# Patient Record
Sex: MALE | Race: WHITE | Employment: FULL TIME | URBAN - METROPOLITAN AREA
[De-identification: names, ages, dates, MRNs, and addresses within clinical notes are randomized per-mention and may not be internally consistent; named-entity substitution may affect disease eponyms.]

---

## 2018-12-12 ENCOUNTER — HOSPITAL ENCOUNTER (OUTPATIENT)
Dept: GENERAL RADIOLOGY | Age: 58
Discharge: HOME | End: 2018-12-12
Attending: FAMILY MEDICINE
Payer: COMMERCIAL

## 2018-12-12 DIAGNOSIS — Z01.818 PREOP EXAMINATION: ICD-10-CM

## 2018-12-12 PROCEDURE — 71046 X-RAY EXAM CHEST 2 VIEWS: CPT

## 2018-12-18 RX ORDER — PRAVASTATIN SODIUM 40 MG/1
40 TABLET ORAL DAILY
COMMUNITY

## 2018-12-18 RX ORDER — BISMUTH SUBSALICYLATE 262 MG
1 TABLET,CHEWABLE ORAL DAILY
COMMUNITY

## 2018-12-18 RX ORDER — BENAZEPRIL HYDROCHLORIDE AND HYDROCHLOROTHIAZIDE 20; 12.5 MG/1; MG/1
1 TABLET ORAL DAILY
COMMUNITY

## 2018-12-18 RX ORDER — TAMSULOSIN HYDROCHLORIDE 0.4 MG/1
0.4 CAPSULE ORAL DAILY
COMMUNITY

## 2018-12-18 NOTE — PERIOP NOTES
[address] , [address] [facility]                            MAIN OR                                  [phone]   MAIN PRE OP                          [phone]                                                                                AMBULATORY PRE OP          [phone]  PRE-ADMISSION TESTING    [phone]     Surgery Date:   12/20/18       Is surgery arrival time given by surgeon? NO  If NO, [facility] staff will call you between 3 and 7pm the day before your surgery with your arrival time. (If your surgery is on a Monday, we will call you the Friday before.)    Call [phone] after 7pm Monday-Friday if you did not receive your arrival time. INSTRUCTIONS BEFORE YOUR SURGERY   When You  Arrive   Arrive at the [address] [facility] on the day of your surgery  Have your insurance card, photo ID, and any copayment (if needed)     Food   and   Drink   NO food or drink after midnight the night before surgery    This means NO water, gum, mints, coffee, juice, etc.  No alcohol (beer, wine, liquor) 24 hours before and after surgery     Medications to   TAKE   Morning of Surgery   MEDICATIONS TO TAKE THE MORNING OF SURGERY WITH A SIP OF WATER:   flomax      Medications  To  STOP      7 days before surgery    Non-Steroidal anti-inflammatory Drugs (NSAID's): for example, Ibuprofen (Advil, Motrin), Naproxen (Aleve)   Aspirin, if taking for pain    Herbal supplements, vitamins, and fish oil   Other:  (Pain medications not listed above, including Tylenol may be taken)   Blood  Thinners    If you take  Aspirin, Plavix, Coumadin, or any blood-thinning or anti-blood clot medicine, talk to the doctor who prescribed the medications for pre-operative instructions.      Bathing Clothing  Jewelry  Valuables       If you shower the morning of surgery, please do not apply anything to your skin (lotions, powders, deodorant, or makeup, especially angeles)   Follow all special bath instructions (for total joint replacement, spine and bowel surgeries)   Do not shave or trim anywhere 24 hours before surgery   Wear your hair loose or down; no pony-tails, buns, or metal hair clips   Wear loose, comfortable, clean clothes   Wear glasses instead of contacts   Leave money, valuables, and jewelry, including body piercings, at home     Going Home       or Spending the Night    SAME-DAY SURGERY: You must have a responsible adult drive you home and stay with you 24 hours after surgery   ADMITS: If your doctor is keeping you into the hospital after surgery, leave personal belongings/luggage in your car until you have a hospital room number. Hospital discharge time is 12 noon  Drivers must be here before 12 noon unless you are told differently   Special Instructions Bring glasses case        Follow all instructions so your surgery wont be cancelled. Please, be on time. If a situation occurs and you are delayed the day of surgery, call [phone] or          [phone]. If your physical condition changes (like a fever, cold, flu, etc.) call your surgeon. The patient was contacted  via phone. The patient verbalizes understanding of all instructions and does not  need reinforcement.

## 2018-12-19 ENCOUNTER — ANESTHESIA EVENT (OUTPATIENT)
Dept: SURGERY | Age: 58
End: 2018-12-19
Payer: COMMERCIAL

## 2018-12-20 ENCOUNTER — APPOINTMENT (OUTPATIENT)
Dept: GENERAL RADIOLOGY | Age: 58
End: 2018-12-20
Attending: PODIATRIST
Payer: COMMERCIAL

## 2018-12-20 ENCOUNTER — ANESTHESIA (OUTPATIENT)
Dept: SURGERY | Age: 58
End: 2018-12-20
Payer: COMMERCIAL

## 2018-12-20 ENCOUNTER — HOSPITAL ENCOUNTER (OUTPATIENT)
Age: 58
Setting detail: OUTPATIENT SURGERY
Discharge: HOME | End: 2018-12-20
Attending: PODIATRIST | Admitting: PODIATRIST
Payer: COMMERCIAL

## 2018-12-20 VITALS
TEMPERATURE: 98 F | HEIGHT: 69 IN | WEIGHT: 199.52 LBS | DIASTOLIC BLOOD PRESSURE: 83 MMHG | OXYGEN SATURATION: 99 % | RESPIRATION RATE: 15 BRPM | SYSTOLIC BLOOD PRESSURE: 159 MMHG | BODY MASS INDEX: 29.55 KG/M2 | HEART RATE: 85 BPM

## 2018-12-20 PROBLEM — M62.40: Status: ACTIVE | Noted: 2018-12-20

## 2018-12-20 PROBLEM — M20.41 HAMMERTOE OF RIGHT FOOT: Status: ACTIVE | Noted: 2018-12-20

## 2018-12-20 PROBLEM — M20.11 HALLUX VALGUS (ACQUIRED), RIGHT FOOT: Status: ACTIVE | Noted: 2018-12-20

## 2018-12-20 PROCEDURE — 74011250636 HC RX REV CODE- 250/636

## 2018-12-20 PROCEDURE — 77030032490 HC SLV COMPR SCD KNE COVD -B: Performed by: PODIATRIST

## 2018-12-20 PROCEDURE — 76210000020 HC REC RM PH II FIRST 0.5 HR: Performed by: PODIATRIST

## 2018-12-20 PROCEDURE — 77030011640 HC PAD GRND REM COVD -A: Performed by: PODIATRIST

## 2018-12-20 PROCEDURE — 74011000250 HC RX REV CODE- 250: Performed by: PODIATRIST

## 2018-12-20 PROCEDURE — 77030020782 HC GWN BAIR PAWS FLX 3M -B

## 2018-12-20 PROCEDURE — 77030021807 HC PIN TEMP FIX WRGH -B: Performed by: PODIATRIST

## 2018-12-20 PROCEDURE — 77030002933 HC SUT MCRYL J&J -A: Performed by: PODIATRIST

## 2018-12-20 PROCEDURE — 74011250636 HC RX REV CODE- 250/636: Performed by: ANESTHESIOLOGY

## 2018-12-20 PROCEDURE — C1713 ANCHOR/SCREW BN/BN,TIS/BN: HCPCS | Performed by: PODIATRIST

## 2018-12-20 PROCEDURE — 76210000016 HC OR PH I REC 1 TO 1.5 HR: Performed by: PODIATRIST

## 2018-12-20 PROCEDURE — 77030003790: Performed by: PODIATRIST

## 2018-12-20 PROCEDURE — 77030031139 HC SUT VCRL2 J&J -A: Performed by: PODIATRIST

## 2018-12-20 PROCEDURE — 74011250636 HC RX REV CODE- 250/636: Performed by: PODIATRIST

## 2018-12-20 PROCEDURE — 77030000032 HC CUF TRNQT ZIMM -B: Performed by: PODIATRIST

## 2018-12-20 PROCEDURE — 77030006773 HC BLD SAW OSC BRSM -A: Performed by: PODIATRIST

## 2018-12-20 PROCEDURE — 77030029732 HC BIT DRL ORTHOLOC 3DI WRGH -B: Performed by: PODIATRIST

## 2018-12-20 PROCEDURE — 77030008684 HC TU ET CUF COVD -B: Performed by: ANESTHESIOLOGY

## 2018-12-20 PROCEDURE — 77030018836 HC SOL IRR NACL ICUM -A: Performed by: PODIATRIST

## 2018-12-20 PROCEDURE — 74011000250 HC RX REV CODE- 250

## 2018-12-20 PROCEDURE — C1781 MESH (IMPLANTABLE): HCPCS | Performed by: PODIATRIST

## 2018-12-20 PROCEDURE — 77030003044 HC WRE K WRGH -B: Performed by: PODIATRIST

## 2018-12-20 PROCEDURE — 76030000006 HC AMB SURG OR TIME 3 TO 3.5: Performed by: PODIATRIST

## 2018-12-20 PROCEDURE — 76001 XR FLUOROSCOPY OVER 60 MINUTES: CPT

## 2018-12-20 PROCEDURE — 76060000066 HC AMB SURG ANES 3 TO 3.5 HR: Performed by: PODIATRIST

## 2018-12-20 PROCEDURE — 77030039553: Performed by: PODIATRIST

## 2018-12-20 DEVICE — IMPLANTABLE DEVICE
Type: IMPLANTABLE DEVICE | Site: TOE | Status: FUNCTIONAL
Brand: ORTHOLOC

## 2018-12-20 DEVICE — IMPLANTABLE DEVICE
Type: IMPLANTABLE DEVICE | Site: TOE | Status: FUNCTIONAL
Brand: ORTHOLOC 3DI

## 2018-12-20 RX ORDER — ONDANSETRON 2 MG/ML
4 INJECTION INTRAMUSCULAR; INTRAVENOUS AS NEEDED
Status: DISCONTINUED | OUTPATIENT
Start: 2018-12-20 | End: 2018-12-21 | Stop reason: HOSPADM

## 2018-12-20 RX ORDER — CEFAZOLIN SODIUM/WATER 2 G/20 ML
2 SYRINGE (ML) INTRAVENOUS ONCE
Status: COMPLETED | OUTPATIENT
Start: 2018-12-20 | End: 2018-12-20

## 2018-12-20 RX ORDER — ROCURONIUM BROMIDE 10 MG/ML
INJECTION, SOLUTION INTRAVENOUS AS NEEDED
Status: DISCONTINUED | OUTPATIENT
Start: 2018-12-20 | End: 2018-12-20 | Stop reason: HOSPADM

## 2018-12-20 RX ORDER — HYDROMORPHONE HYDROCHLORIDE 2 MG/ML
INJECTION, SOLUTION INTRAMUSCULAR; INTRAVENOUS; SUBCUTANEOUS AS NEEDED
Status: DISCONTINUED | OUTPATIENT
Start: 2018-12-20 | End: 2018-12-20 | Stop reason: HOSPADM

## 2018-12-20 RX ORDER — DEXAMETHASONE SODIUM PHOSPHATE 4 MG/ML
INJECTION, SOLUTION INTRA-ARTICULAR; INTRALESIONAL; INTRAMUSCULAR; INTRAVENOUS; SOFT TISSUE AS NEEDED
Status: DISCONTINUED | OUTPATIENT
Start: 2018-12-20 | End: 2018-12-20 | Stop reason: HOSPADM

## 2018-12-20 RX ORDER — SODIUM CHLORIDE, SODIUM LACTATE, POTASSIUM CHLORIDE, CALCIUM CHLORIDE 600; 310; 30; 20 MG/100ML; MG/100ML; MG/100ML; MG/100ML
INJECTION, SOLUTION INTRAVENOUS
Status: DISCONTINUED | OUTPATIENT
Start: 2018-12-20 | End: 2018-12-20 | Stop reason: HOSPADM

## 2018-12-20 RX ORDER — SUCCINYLCHOLINE CHLORIDE 20 MG/ML
INJECTION INTRAMUSCULAR; INTRAVENOUS AS NEEDED
Status: DISCONTINUED | OUTPATIENT
Start: 2018-12-20 | End: 2018-12-20 | Stop reason: HOSPADM

## 2018-12-20 RX ORDER — LIDOCAINE HYDROCHLORIDE 10 MG/ML
INJECTION INFILTRATION; PERINEURAL AS NEEDED
Status: DISCONTINUED | OUTPATIENT
Start: 2018-12-20 | End: 2018-12-20 | Stop reason: HOSPADM

## 2018-12-20 RX ORDER — PROPOFOL 10 MG/ML
INJECTION, EMULSION INTRAVENOUS AS NEEDED
Status: DISCONTINUED | OUTPATIENT
Start: 2018-12-20 | End: 2018-12-20 | Stop reason: HOSPADM

## 2018-12-20 RX ORDER — ALBUTEROL SULFATE 0.83 MG/ML
2.5 SOLUTION RESPIRATORY (INHALATION) AS NEEDED
Status: DISCONTINUED | OUTPATIENT
Start: 2018-12-20 | End: 2018-12-21 | Stop reason: HOSPADM

## 2018-12-20 RX ORDER — HYDROMORPHONE HYDROCHLORIDE 1 MG/ML
0.5 INJECTION, SOLUTION INTRAMUSCULAR; INTRAVENOUS; SUBCUTANEOUS
Status: ACTIVE | OUTPATIENT
Start: 2018-12-20 | End: 2018-12-20

## 2018-12-20 RX ORDER — MIDAZOLAM HYDROCHLORIDE 1 MG/ML
INJECTION, SOLUTION INTRAMUSCULAR; INTRAVENOUS AS NEEDED
Status: DISCONTINUED | OUTPATIENT
Start: 2018-12-20 | End: 2018-12-20 | Stop reason: HOSPADM

## 2018-12-20 RX ORDER — ONDANSETRON 2 MG/ML
INJECTION INTRAMUSCULAR; INTRAVENOUS AS NEEDED
Status: DISCONTINUED | OUTPATIENT
Start: 2018-12-20 | End: 2018-12-20 | Stop reason: HOSPADM

## 2018-12-20 RX ORDER — BUPIVACAINE HYDROCHLORIDE AND EPINEPHRINE 5; 5 MG/ML; UG/ML
INJECTION, SOLUTION EPIDURAL; INTRACAUDAL; PERINEURAL AS NEEDED
Status: DISCONTINUED | OUTPATIENT
Start: 2018-12-20 | End: 2018-12-20 | Stop reason: HOSPADM

## 2018-12-20 RX ORDER — BUPIVACAINE HYDROCHLORIDE 5 MG/ML
INJECTION, SOLUTION EPIDURAL; INTRACAUDAL AS NEEDED
Status: DISCONTINUED | OUTPATIENT
Start: 2018-12-20 | End: 2018-12-20 | Stop reason: HOSPADM

## 2018-12-20 RX ORDER — FENTANYL CITRATE 50 UG/ML
INJECTION, SOLUTION INTRAMUSCULAR; INTRAVENOUS AS NEEDED
Status: DISCONTINUED | OUTPATIENT
Start: 2018-12-20 | End: 2018-12-20 | Stop reason: HOSPADM

## 2018-12-20 RX ORDER — EPHEDRINE SULFATE 50 MG/ML
INJECTION, SOLUTION INTRAVENOUS AS NEEDED
Status: DISCONTINUED | OUTPATIENT
Start: 2018-12-20 | End: 2018-12-20 | Stop reason: HOSPADM

## 2018-12-20 RX ORDER — NALOXONE HYDROCHLORIDE 0.4 MG/ML
INJECTION, SOLUTION INTRAMUSCULAR; INTRAVENOUS; SUBCUTANEOUS AS NEEDED
Status: DISCONTINUED | OUTPATIENT
Start: 2018-12-20 | End: 2018-12-20 | Stop reason: HOSPADM

## 2018-12-20 RX ORDER — SODIUM CHLORIDE, SODIUM LACTATE, POTASSIUM CHLORIDE, CALCIUM CHLORIDE 600; 310; 30; 20 MG/100ML; MG/100ML; MG/100ML; MG/100ML
150 INJECTION, SOLUTION INTRAVENOUS CONTINUOUS
Status: DISCONTINUED | OUTPATIENT
Start: 2018-12-20 | End: 2018-12-20 | Stop reason: HOSPADM

## 2018-12-20 RX ORDER — SODIUM CHLORIDE, SODIUM LACTATE, POTASSIUM CHLORIDE, CALCIUM CHLORIDE 600; 310; 30; 20 MG/100ML; MG/100ML; MG/100ML; MG/100ML
125 INJECTION, SOLUTION INTRAVENOUS CONTINUOUS
Status: DISCONTINUED | OUTPATIENT
Start: 2018-12-20 | End: 2018-12-21 | Stop reason: HOSPADM

## 2018-12-20 RX ORDER — DIPHENHYDRAMINE HYDROCHLORIDE 50 MG/ML
12.5 INJECTION, SOLUTION INTRAMUSCULAR; INTRAVENOUS AS NEEDED
Status: DISCONTINUED | OUTPATIENT
Start: 2018-12-20 | End: 2018-12-20 | Stop reason: HOSPADM

## 2018-12-20 RX ORDER — LIDOCAINE HYDROCHLORIDE 10 MG/ML
0.1 INJECTION, SOLUTION EPIDURAL; INFILTRATION; INTRACAUDAL; PERINEURAL AS NEEDED
Status: DISCONTINUED | OUTPATIENT
Start: 2018-12-20 | End: 2018-12-20 | Stop reason: HOSPADM

## 2018-12-20 RX ADMIN — ROCURONIUM BROMIDE 20 MG: 10 INJECTION, SOLUTION INTRAVENOUS at 17:20

## 2018-12-20 RX ADMIN — PROPOFOL 200 MG: 10 INJECTION, EMULSION INTRAVENOUS at 14:54

## 2018-12-20 RX ADMIN — FENTANYL CITRATE 100 MCG: 50 INJECTION, SOLUTION INTRAMUSCULAR; INTRAVENOUS at 14:50

## 2018-12-20 RX ADMIN — FENTANYL CITRATE 50 MCG: 50 INJECTION, SOLUTION INTRAMUSCULAR; INTRAVENOUS at 15:46

## 2018-12-20 RX ADMIN — EPHEDRINE SULFATE 10 MG: 50 INJECTION, SOLUTION INTRAVENOUS at 17:37

## 2018-12-20 RX ADMIN — EPHEDRINE SULFATE 10 MG: 50 INJECTION, SOLUTION INTRAVENOUS at 16:43

## 2018-12-20 RX ADMIN — SUCCINYLCHOLINE CHLORIDE 140 MG: 20 INJECTION INTRAMUSCULAR; INTRAVENOUS at 14:54

## 2018-12-20 RX ADMIN — Medication 2 G: at 14:57

## 2018-12-20 RX ADMIN — SODIUM CHLORIDE, SODIUM LACTATE, POTASSIUM CHLORIDE, CALCIUM CHLORIDE: 600; 310; 30; 20 INJECTION, SOLUTION INTRAVENOUS at 16:44

## 2018-12-20 RX ADMIN — ROCURONIUM BROMIDE 20 MG: 10 INJECTION, SOLUTION INTRAVENOUS at 15:12

## 2018-12-20 RX ADMIN — SODIUM CHLORIDE, SODIUM LACTATE, POTASSIUM CHLORIDE, AND CALCIUM CHLORIDE 150 ML/HR: 600; 310; 30; 20 INJECTION, SOLUTION INTRAVENOUS at 10:15

## 2018-12-20 RX ADMIN — ONDANSETRON 4 MG: 2 INJECTION INTRAMUSCULAR; INTRAVENOUS at 17:36

## 2018-12-20 RX ADMIN — LIDOCAINE HYDROCHLORIDE 0.1 ML: 10 INJECTION, SOLUTION EPIDURAL; INFILTRATION; INTRACAUDAL; PERINEURAL at 10:15

## 2018-12-20 RX ADMIN — ROCURONIUM BROMIDE 20 MG: 10 INJECTION, SOLUTION INTRAVENOUS at 15:45

## 2018-12-20 RX ADMIN — SODIUM CHLORIDE, SODIUM LACTATE, POTASSIUM CHLORIDE, CALCIUM CHLORIDE: 600; 310; 30; 20 INJECTION, SOLUTION INTRAVENOUS at 14:50

## 2018-12-20 RX ADMIN — NALOXONE HYDROCHLORIDE 0.1 MG: 0.4 INJECTION, SOLUTION INTRAMUSCULAR; INTRAVENOUS; SUBCUTANEOUS at 18:01

## 2018-12-20 RX ADMIN — HYDROMORPHONE HYDROCHLORIDE 2 MG: 2 INJECTION, SOLUTION INTRAMUSCULAR; INTRAVENOUS; SUBCUTANEOUS at 17:20

## 2018-12-20 RX ADMIN — MIDAZOLAM HYDROCHLORIDE 2 MG: 1 INJECTION, SOLUTION INTRAMUSCULAR; INTRAVENOUS at 14:50

## 2018-12-20 RX ADMIN — DEXAMETHASONE SODIUM PHOSPHATE 8 MG: 4 INJECTION, SOLUTION INTRA-ARTICULAR; INTRALESIONAL; INTRAMUSCULAR; INTRAVENOUS; SOFT TISSUE at 17:36

## 2018-12-20 RX ADMIN — FENTANYL CITRATE 50 MCG: 50 INJECTION, SOLUTION INTRAMUSCULAR; INTRAVENOUS at 16:18

## 2018-12-20 NOTE — ANESTHESIA PREPROCEDURE EVALUATION
Anesthetic History   No history of anesthetic complications            Review of Systems / Medical History  Patient summary reviewed and pertinent labs reviewed    Pulmonary  Within defined limits                 Neuro/Psych         Psychiatric history     Cardiovascular    Hypertension              Exercise tolerance: >4 METS     GI/Hepatic/Renal  Within defined limits              Endo/Other  Within defined limits           Other Findings              Physical Exam    Airway  Mallampati: II  TM Distance: 4 - 6 cm  Neck ROM: normal range of motion   Mouth opening: Normal     Cardiovascular    Rhythm: regular  Rate: normal         Dental    Dentition: Lower partial plate and Upper partial plate     Pulmonary  Breath sounds clear to auscultation               Abdominal  GI exam deferred       Other Findings            Anesthetic Plan    ASA: 2  Anesthesia type: general          Induction: Intravenous  Anesthetic plan and risks discussed with: Patient

## 2018-12-20 NOTE — DISCHARGE INSTRUCTIONS
Post-Operative Instructions:    Patient Name: Jose A Reis. Patient MRN: [id number]  : 1960  Discharged Date: 2018      MEDICATIONS:   -If you experience nausea, take anti-nausea medication   -Take pain medication regularly for first 48 hours. Then take as needed for pain.     -Often anti-nausea medication helps relieve pain due to it's mild sedative effect.   -Constipation can be a common side effect when taking narcotic pain medications, take precautions to avoid this:    -Drink plenty of fluids    -Eat plenty of fiber    -Avoid caffeine and dairy products    -Take stool softener if needed (Colace/Dulcolax)    DIET:   -Eat light foods for first meal today (ie: dry cereal/toast/crackers, clear fluids). -If tolerated first meal, then can eat normally at second meal.    ACTIVITY:   -Relative BED REST for first 72 hours (only getting to bathroom, bedroom, kitchen)   -Keep surgical shoe/boot on at all times (even when sleeping)   -Elevate surgical site at all times (at least 6 inches above hip level)   -Apply ice pack to just above surgical site (NOT directly on the site), 10 mins on and 20 mins off for the first 72 hours.   -Weight-bearing: NWB right foot with crutches. DRESSINGS/SHOWER:   -Keep dressing clean, dry, and intact at all times.   -Reinforce dressing as needed, but DO NOT remove dressing!! EMERGENCY:   -Call office ([phone]) or on all pager after hours ([phone]) if. ..    -bandages become wet or heavy drainage/bleeding noted    -medications are not helping your pain    -you injure or bump the surgical site    -you have fever over 101.5 degrees    FOLLOW-UP:   -Make sure you follow-up with your doctor within 1 week of surgery.    -Call office ([phone]) if you need to schedule appointment     ACTIVITY:  · Elevate feet  · Use crutches as directed by your doctor    DIET:  · Clear liquids until no nausea or vomiting; then light diet for the first day  · An advance to regular diet On second day, unless your doctor orders otherwise. PAIN:  · Take pain medication as directed by your doctor. · Call your doctor if pain is not relieved by medication. · Do not take aspirin or blood thinners until directed by your doctor. Patients signature:  Date: 12/20/2018  Discharging Nurse:    Physician: Cayla Guillermo., DPM            DISCHARGE SUMMARY from your Nurse    The following personal items collected during your admission are returned to you:   Dental Appliance: Dental Appliances: None  Vision: Visual Aid: Glasses  Hearing Aid:    Jewelry: Jewelry: None  Clothing: Clothing: Footwear, Pants, Undergarments, Shirt  Other Valuables: Other Valuables: None  Valuables sent to safe: Personal Items Sent to Safe: n/a    PATIENT INSTRUCTIONS:    After general anesthesia or intravenous sedation, for 24 hours or while taking prescription Narcotics:  · Limit your activities  · Do not drive and operate hazardous machinery  · Do not make important personal or business decisions  · Do  not drink alcoholic beverages  · If you have not urinated within 8 hours after discharge, please contact your surgeon on call. Report the following to your surgeon:  · Excessive pain, swelling, redness or odor of or around the surgical area  · Temperature over 100.5  · Nausea and vomiting lasting longer than 4 hours or if unable to take medications  · Any signs of decreased circulation or nerve impairment to extremity: change in color, persistent  numbness, tingling, coldness or increase pain  · Any questions    COUGH AND DEEP BREATHE    Breathing deep and coughing are very important exercises to do after surgery. Deep breathing and coughing open the little air tubes and air sacks in your lungs. You take deep breaths every day. You may not even notice - it is just something you do when you sigh or yawn. It is a natural exercise you do to keep these air passages open.      After surgery, take deep breaths and cough, on purpose. Coughing and deep breathing help prevent bronchitis and pneumonia after surgery. If you had chest or belly surgery, use a pillow as a \"hug crescencio\" and hold it tightly to your chest or belly when you cough. DIRECTIONS:  6. Take 10 to 15 slow deep breaths every hour while awake. 7. Breathe in deeply, and hold it for 2 seconds. 8. Exhale slowly through puckered lips, like blowing up a balloon. 9. After every 4th or 5th deep breath, hug your pillow to your chest or belly and give a hard, deep cough. Yes, it will probably hurt. But doing this exercise is very important part of healing after surgery. Take your pain medicine to help you do this exercise without too much pain. IF YOU HAVE BEEN DIAGNOSED WITH SLEEP APNEA, PLEASE USE YOUR SLEEP APNEA DEVICE OR CPAP MACHINE WHEN YOU INTEND TO NAP AFTER TAKING PAIN MEDICATION. Ankle Pumps    Ankle pumps increase the circulation of oxygenated blood to your lower extremities and decrease your risk for circulation problems such as blood clots. They also stretch the muscles, tendons and ligaments in your foot and ankle, and prevent joint contracture in the ankle and foot, especially after surgeries on the legs. It is important to do ankle pump exercises regularly after surgery because immobility increases your risk for developing a blood clot. Your doctor may also have you take an Aspirin for the next few days as well. If your doctor did not ask you to take an Aspirin, consult with him before starting Aspirin therapy on your own. Slowly point your foot forward, feeling the muscles on the top of your lower leg stretch, and hold this position for 5 seconds. Next, pull your foot back toward you as far as possible, stretching the calf muscles, and hold that position for 5 seconds. Repeat with the other foot.   Perform 10 repetitions every hour while awake for both ankles if possible (down and then up with the foot once is one repetition). You should feel gentle stretching of the muscles in your lower leg when doing this exercise. If you feel pain, or your range of motion is limited, don't  Push too hard. Only go the limit your joint and muscles will let you go. If you have increasing pain, progressively worsening leg warmth or swelling, STOP the exercise and call your doctor. Below is information about the medications your doctor is prescribing after your visit:    Other information in your discharge envelope:  []     PRESCRIPTIONS  []     PHYSICAL THERAPY PRESCRIPTION  []     APPOINTMENT CARDS  []     Regional Anesthesia Pamphlet for block or block with On-Q Catheter from Anesthesia Service  []     Medical device information sheets/pamphlets from their    []     School/work excuse note. []     /parent work excuse note. These are general instructions for a healthy lifestyle:    *  Please give a list of your current medications to your Primary Care Provider. *  Please update this list whenever your medications are discontinued, doses are      changed, or new medications (including over-the-counter products) are added. *  Please carry medication information at all times in case of emergency situations. About Smoking  No smoking / No tobacco products / Avoid exposure to second hand smoke    Surgeon General's Warning:  Quitting smoking now greatly reduces serious risk to your health. Obesity, smoking, and sedentary lifestyle greatly increases your risk for illness and disease. A healthy diet, regular physical exercise & weight monitoring are important for maintaining a healthy lifestyle.     Congestive Heart Failure  You may be retaining fluid if you have a history of heart failure or if you experience any of the following symptoms:  Weight gain of 3 pounds or more overnight or 5 pounds in a week, increased swelling in our hands or feet or shortness of breath while lying flat in bed. Please call your doctor as soon as you notice any of these symptoms; do not wait until your next office visit. Recognize signs and symptoms of STROKE:  F - face looks uneven  A - arms unable to move or move even  S - speech slurred or non-existent  T - time-call 911 as soon as signs and symptoms begin-DO NOT go         Back to bed or wait to see if you get better-TIME IS BRAIN. Warning signs of HEART ATTACK  Call 911 if you have these symptoms    · Chest discomfort. Most heart attacks involve discomfort in the center of the chest that lasts more than a few minutes, or that goes away and comes back. It can feel like uncomfortable pressure, squeezing, fullness, or pain. · Discomfort in other areas of the upper body. Symptoms can include pain or discomfort in one or both        Arms, the back, neck, jaw, or stomach. ·  Shortness of breath with or without chest discomfort. · Other signs may include breaking out in a cold sweat, nausea, or lightheadedness    Don't wait more than five minutes to call 911 - MINUTES MATTER! Fast action can save your life. Calling 911 is almost always the fastest way to get lifesaving treatment. Emergency Medical Services staff can begin treatment when they arrive - up to an hour sooner than if someone gets to the hospital by car.

## 2018-12-20 NOTE — BRIEF OP NOTE
BRIEF OPERATIVE NOTE    Date of Procedure: 12/20/2018   Preoperative Diagnosis: HALLUX VALGUS DEFORMITY. 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 CONTRACTED EXTENSOR TENDON 2ND MPJ ALL Right foot  Postoperative Diagnosis: * HALLUX VALGUS DEFORMITY. 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 CONTRACTED EXTENSOR TENDON 2ND MPJ ALL Right foot   Procedure(s): 1st MPJ ARTHRODESIS. 2ND PIPJ ARTHRODESIS. TENOTOMY AND CAPSULOTOMY 2ND MPJ  Surgeon(s) and Role:     * Zhou Coelho DPM - Primary         Surgical Assistant: none. Surgical Staff:  * No surgical staff found *  No case tracking events are documented in the log. Anesthesia: General   Estimated Blood Loss: 5 mL. Specimens: * No specimens in log *   Findings: Hypertrophic bone, contracted tendons. Complications: None.    Implants: * No implants in log *

## 2018-12-21 NOTE — ANESTHESIA POSTPROCEDURE EVALUATION
Procedure(s):  1ST METATARSAL PHALANGEAL JOINT FUSION WITH PLATE SCREW FIXATION, 2ND HAMMERTOE CORRECTION TENOTOMY, CAPSULOTOMY RIGHT FOOT.     Anesthesia Post Evaluation      Multimodal analgesia: multimodal analgesia used between 6 hours prior to anesthesia start to PACU discharge  Patient location during evaluation: bedside  Patient participation: complete - patient participated  Level of consciousness: awake  Pain management: adequate  Airway patency: patent  Anesthetic complications: no  Cardiovascular status: acceptable  Respiratory status: acceptable  Hydration status: acceptable        Visit Vitals  /83   Pulse 85   Temp 36.7 °C (98 °F)   Resp 15   Ht 5' 9\" (1.753 m)   Wt 90.5 kg (199 lb 8.3 oz)   SpO2 99%   BMI 29.46 kg/m²

## 2018-12-21 NOTE — OP NOTES
[address] REPORT    Wilver Inman, [place]  MR#: [id number]  : 1960  ACCOUNT #: [de-identified]   DATE OF SERVICE: 2018    SURGEON: Abby Retana.TIGRE    ASSISTANT:  None. ANESTHESIOLOGIST:  Dr. Luke Laureano:  1. Hallux valgus deformity of the right foot. 2.  Hammertoe deformity of the second digit of the right foot. 3.  Contracted second metatarsophalangeal joint at the extensor tendons of the right foot. POSTOPERATIVE DIAGNOSES:  1. Hallux valgus deformity of the right foot. 2.  Hammertoe deformity of the second digit of the right foot. 3.  Contracted second metatarsophalangeal joint at the extensor tendons of the right foot. PROCEDURES PERFORMED:  1. Hallux valgus correction with first metatarsophalangeal joint arthrodesis with screw and plate fixation. 2.  Hammertoe deformity correction with proximal interphalangeal joint fusion with threaded K-wire fixation. 3.  Correction of contracted extensor tendon with tenotomy and capsulotomy of the second metatarsophalangeal joint, all of the right foot. COMPLICATIONS:  None. ESTIMATED BLOOD LOSS:  5 mL. FINDINGS:  Degenerative arthritis and hypertrophic bone formation as well as contracted extensor tendon. ANESTHESIA:  General.    SPECIMENS REMOVED:  Hypertrophic bone. IMPLANTS:  Two 2.7 mm screws as well as one 3.5 mm interfrag screw as well as 2 3.5 mm screws along with the plate from the 3-D cross check set from [facility].  Additional implant was the 0.062 inch threaded K-wire. INDICATIONS:  The patient is a 62-year-old white male who presented to the office with a chronically painful bunion and hammertoe and second metatarsophalangeal joint contracture deformity. Patient had undergone an extensive course of nonsurgical care dating back to . Patient declined further nonsurgical care.   Patient had all treatment options reviewed with him from conservative to surgical.  The patient was made aware of all risk, alternatives, and potential benefits, potential complications of surgical management including, but not limited to need for additional surgery, failure of the procedure to achieve desired result, swelling, stiffness, scarring, numbness, nerve damage, potential of delayed union, malunion or nonunion of fusion sites, potential of failure of fixation, potential of need for additional surgery and the potential of surgery making his conditions worse and not better, potential prolonged pain and the potential of chronic pain syndrome, potential of infection of soft tissue and/or bone. The potential of swelling, stiffness, scarring, numbness, nerve damage, potential of the problem not giving him relief and making his condition worse, the potential of over or under correction of the deformities. The patient had the consent reviewed, understood and signed for and elected for surgical management of this condition. PROCEDURE:  The patient was taken to the operating room and placed on the operating room table in supine position. The patient had a well-padded pneumatic ankle tourniquet applied to the right ankle. The patient received 2 grams of Ancef for preoperative prophylactic antibiotics. The patient had the right foot prepped and draped in normal sterile fashion and the pneumatic ankle tourniquet was elevated to 250 mmHg and the procedure began. PROCEDURE #1:  Hallux valgus correction with first metatarsophalangeal joint arthrodesis with plate and screw fixation. Attention was directed towards the first metatarsophalangeal joint where a dorsal linear incision was made approximately 6.5 cm in length just medial to the extensor hallucis longus tendon. The incision was deepened down to the level of subcutaneous tissue, taking care to Bovie retract and ligate all neurovascular structures as necessary.   Then dissection was deepened down to the level of the periosteum and joint capsule and a periosteal capsular incision was made in a T-shape. With the use of a [place] elevator, the periosteum was elevated off of the bone and then with the use of a Brown-Adson forceps and a #15 blade, the capsular attachments were released off the medial aspect of the first metatarsophalangeal joint where a significant hypertrophic eminence of bone was apparent. This was resected with the use of a handheld sagittal saw and then the bone was brought to the back table to be morcellized to be put into the bone graft. Then a lateral spur was resected with the use of the handheld sagittal saw as well and then the bone was smoothed with the use of the reciprocating rasp. Using a dermal curets as well as a reciprocating rasp the first metatarsophalangeal joint was denuded of the remaining cartilage at the joint space. The site was copiously flushed and irrigated. Then, it was fenestrated with the use of a 2.0 mm drill both at the metatarsal and the base of the proximal phalanx. Then, the site was fish scaled with the use of a small osteotome and mallet. Then, the small portions of morselized bone were put into the joint space and then the joint was put into a rectus position in regard to the frontal plane. A flat plate was put in place in that the toe was in adequate alignment from the heel to the ball of the foot and the 1st toe was in placed mild dorsiflexion. The hallux was in approximately 5-6 degrees of valgus significantly decreasing the severe hallux valgus deformity that he had preoperatively. Following the strict technique guide from the [facility] first MPJ plate and screw set.   The plate was put in place along with the pin tacks and then following the technique guide, the proximal 2.7 mm screws were put in place, 1 nonlocking screw initially at the most distal aspect of the plate and then the locking screw was put in place at the next hole in the plate located at the level of the hallux. Next, the interfrag screw was drilled and started and then before closing it down the fixation on the other side of the joint where the pin was holding the plate was removed as well as the temporary 0.062 K-wire that had been put in place was removed and an excellent compression was achieved. A stat interfrag screw was brought down from dorsal distal to proximal plantar. Then, the remaining screws were put in place following strict technique guide. They were 3.5 mm in size, first a nonlocking and then a locking screw. Intraoperative C-arm confirmed excellent reduction of preoperative deformity. Good bone-to-bone contact for the fusion site as well as excellent bone purchase for fixation, which did not penetrate any additional joint spaces including the sesamoids. The site was copiously flushed and irrigated. The periosteum was reapproximated with the use of 3-0 Vicryl in interrupted simple suture fashion. The capsule was reapproximated with the use of 2-0 Vicryl in interrupted simple suture fashion. There was adequate soft tissue coverage for the plate. The subcutaneous tissue was closed with the use of 3-0 Monocryl in interrupted simple suture fashion. A moist sponge was put on the incision site. Next, attention was directed towards the second toe of the right foot. Procedure #2 was hammertoe correction of the second digit on the right foot. Attention was directed towards the second toe where a straight linear incision was made over that toe at the dorsal aspect 3.5 cm in length. The incision was deepened down to the level of subcutaneous tissue, taking care to Bovie retract and ligate all vascular structures as necessary. Then with both blunt and sharp dissection,  the incision was deepened down to the level of the extensor tendon which was identified at the level of the proximal interphalangeal joint. An interphalangeal joint tenotomy was performed. Then, the medial and lateral collateral ligaments attachments were released off of the head of the proximal phalanx. There was noted to be significant wearing of the articular cartilage at this joint space. The joint was denuded of its remaining cartilage with the use of a handheld sagittal saw at the head of the proximal phalanx and the base of the middle phalanx. Then, a threaded K-wire was put in place to complete an arthrodesis. The intraoperative C-arm confirmed excellent position for the K-wire. The site was copiously flushed and irrigated. There was noted to be residual contracture at the second metatarsophalangeal joint. Procedure #3 is tenotomy and capsulotomy of the second metatarsophalangeal joint. The incision was extended 3.5 cm over the second metatarsophalangeal joint at its dorsal aspect. The extensor tendon with its extensor garzon mechanism was released and retracted proximally over the second metatarsophalangeal joint and held with a hemostat. Then the  joint was identified and a transverse capsulotomy was performed and [place]lam[place] elevators were put in place to fully reduce the second metatarsophalangeal joint where significant contractures were identified and fully reduced. The toe was in a rectus position at this stage. The site was copiously flushed and irrigated. The extensor tendon was lengthened and then reapproximated with the use of 3-0 Vicryl in interrupted simple suture fashion and the subcutaneous tissue was reapproximated with the use of 3-0 Monocryl in interrupted simple suture fashion. The skin was closed with the use of 4-0 Monocryl in a continuous simple suture fashion. The skin was closed at the bunion site with the same continuous 4-0 Monocryl in simple suture fashion. The patient received 20 mL of 0.5% Marcaine for postoperative analgesia. Then, the incisions were dressed with Steri-Strips, Xeroform, 4 x 4's, and a Erik wrap.   The pneumatic ankle tourniquet had been deflated. It was noted that the capillary filling time was immediate to all digits of the right foot. Then an Ace bandage was applied. Postoperatively, the patient was stable. Patient tolerated the procedure well without complication, was transported from the OR to the postanesthesia care unit with neurovascular status intact to all digits of the right foot.       TIGRE Heredia  D: 12/20/2018 22:07     T: 12/20/2018 23:38  JOB #: [id number]

## 2019-04-14 ENCOUNTER — HOSPITAL ENCOUNTER (EMERGENCY)
Age: 59
Discharge: HOME | End: 2019-04-14
Attending: STUDENT IN AN ORGANIZED HEALTH CARE EDUCATION/TRAINING PROGRAM
Payer: COMMERCIAL

## 2019-04-14 VITALS
OXYGEN SATURATION: 93 % | DIASTOLIC BLOOD PRESSURE: 66 MMHG | SYSTOLIC BLOOD PRESSURE: 133 MMHG | HEIGHT: 69 IN | BODY MASS INDEX: 29.62 KG/M2 | WEIGHT: 200 LBS | HEART RATE: 80 BPM | TEMPERATURE: 98.6 F | RESPIRATION RATE: 22 BRPM

## 2019-04-14 DIAGNOSIS — R33.9 URINARY RETENTION: Primary | ICD-10-CM

## 2019-04-14 LAB
ALBUMIN SERPL-MCNC: 4 G/DL (ref 3.5–5)
ALBUMIN/GLOB SERPL: 1.3 {RATIO} (ref 1.1–2.2)
ALP SERPL-CCNC: 91 U/L (ref 45–117)
ALT SERPL-CCNC: 33 U/L (ref 12–78)
ANION GAP SERPL CALC-SCNC: 13 MMOL/L (ref 5–15)
APPEARANCE UR: CLEAR
AST SERPL-CCNC: 18 U/L (ref 15–37)
BACTERIA URNS QL MICRO: NEGATIVE /HPF
BASOPHILS # BLD: 0 K/UL (ref 0–0.1)
BASOPHILS NFR BLD: 0 % (ref 0–1)
BILIRUB SERPL-MCNC: 0.4 MG/DL (ref 0.2–1)
BILIRUB UR QL: NEGATIVE
BUN SERPL-MCNC: 14 MG/DL (ref 6–20)
BUN/CREAT SERPL: 13 (ref 12–20)
CALCIUM SERPL-MCNC: 9.1 MG/DL (ref 8.5–10.1)
CHLORIDE SERPL-SCNC: 105 MMOL/L (ref 97–108)
CO2 SERPL-SCNC: 24 MMOL/L (ref 21–32)
COLOR UR: ABNORMAL
COMMENT, HOLDF: NORMAL
CREAT SERPL-MCNC: 1.06 MG/DL (ref 0.7–1.3)
DIFFERENTIAL METHOD BLD: ABNORMAL
EOSINOPHIL # BLD: 0 K/UL (ref 0–0.4)
EOSINOPHIL NFR BLD: 1 % (ref 0–7)
EPITH CASTS URNS QL MICRO: ABNORMAL /LPF
ERYTHROCYTE [DISTWIDTH] IN BLOOD BY AUTOMATED COUNT: 13.8 % (ref 11.5–14.5)
GLOBULIN SER CALC-MCNC: 3.2 G/DL (ref 2–4)
GLUCOSE SERPL-MCNC: 126 MG/DL (ref 65–100)
GLUCOSE UR STRIP.AUTO-MCNC: NEGATIVE MG/DL
HCT VFR BLD AUTO: 40.9 % (ref 36.6–50.3)
HGB BLD-MCNC: 14.1 G/DL (ref 12.1–17)
HGB UR QL STRIP: ABNORMAL
KETONES UR QL STRIP.AUTO: NEGATIVE MG/DL
LEUKOCYTE ESTERASE UR QL STRIP.AUTO: NEGATIVE
LIPASE SERPL-CCNC: 639 U/L (ref 73–393)
LYMPHOCYTES # BLD: 1.4 K/UL
LYMPHOCYTES NFR BLD: 18 % (ref 12–49)
MCH RBC QN AUTO: 30.5 PG (ref 26–34)
MCHC RBC AUTO-ENTMCNC: 34.5 G/DL (ref 30–36.5)
MCV RBC AUTO: 88.3 FL (ref 80–99)
MONOCYTES # BLD: 0.4 K/UL (ref 0–1)
MONOCYTES NFR BLD: 5 % (ref 5–13)
NEUTS SEG # BLD: 5.8 K/UL (ref 1.8–8)
NEUTS SEG NFR BLD: 76 % (ref 32–75)
NITRITE UR QL STRIP.AUTO: NEGATIVE
PH UR STRIP: 5.5 [PH] (ref 5–8)
PLATELET # BLD AUTO: 274 K/UL (ref 150–400)
PMV BLD AUTO: 9.6 FL (ref 8.9–12.9)
POTASSIUM SERPL-SCNC: 4.1 MMOL/L (ref 3.5–5.1)
PROT SERPL-MCNC: 7.2 G/DL (ref 6.4–8.2)
PROT UR STRIP-MCNC: NEGATIVE MG/DL
RBC # BLD AUTO: 4.63 M/UL (ref 4.1–5.7)
RBC #/AREA URNS HPF: ABNORMAL /HPF (ref 0–5)
SAMPLES BEING HELD,HOLD: NORMAL
SODIUM SERPL-SCNC: 142 MMOL/L (ref 136–145)
SP GR UR REFRACTOMETRY: 1 (ref 1–1.03)
UR CULT HOLD, URHOLD: NORMAL
UROBILINOGEN UR QL STRIP.AUTO: 0.2 EU/DL (ref 0.2–1)
WBC # BLD AUTO: 7.7 K/UL (ref 4.1–11.1)
WBC URNS QL MICRO: ABNORMAL /HPF (ref 0–4)

## 2019-04-14 PROCEDURE — 80053 COMPREHEN METABOLIC PANEL: CPT

## 2019-04-14 PROCEDURE — 74011250636 HC RX REV CODE- 250/636

## 2019-04-14 PROCEDURE — 77030034850

## 2019-04-14 PROCEDURE — 83690 ASSAY OF LIPASE: CPT

## 2019-04-14 PROCEDURE — 99284 EMERGENCY DEPT VISIT MOD MDM: CPT

## 2019-04-14 PROCEDURE — 74011000250 HC RX REV CODE- 250

## 2019-04-14 PROCEDURE — 36415 COLL VENOUS BLD VENIPUNCTURE: CPT

## 2019-04-14 PROCEDURE — 51702 INSERT TEMP BLADDER CATH: CPT

## 2019-04-14 PROCEDURE — 77030034696 HC CATH URETH FOL 2W BARD -A

## 2019-04-14 PROCEDURE — 85025 COMPLETE CBC W/AUTO DIFF WBC: CPT

## 2019-04-14 PROCEDURE — 81001 URINALYSIS AUTO W/SCOPE: CPT

## 2019-04-14 RX ORDER — ONDANSETRON 2 MG/ML
INJECTION INTRAMUSCULAR; INTRAVENOUS
Status: COMPLETED
Start: 2019-04-14 | End: 2019-04-14

## 2019-04-14 RX ORDER — LIDOCAINE HYDROCHLORIDE 20 MG/ML
JELLY TOPICAL
Status: COMPLETED
Start: 2019-04-14 | End: 2019-04-14

## 2019-04-14 RX ORDER — ONDANSETRON 4 MG/1
8 TABLET, ORALLY DISINTEGRATING ORAL
Status: DISCONTINUED | OUTPATIENT
Start: 2019-04-14 | End: 2019-04-14 | Stop reason: HOSPADM

## 2019-04-14 RX ORDER — LIDOCAINE HYDROCHLORIDE 20 MG/ML
JELLY TOPICAL ONCE
Status: COMPLETED | OUTPATIENT
Start: 2019-04-14 | End: 2019-04-14

## 2019-04-14 RX ADMIN — LIDOCAINE HYDROCHLORIDE: 20 JELLY TOPICAL at 08:06

## 2019-04-14 RX ADMIN — ONDANSETRON 4 MG: 2 INJECTION INTRAMUSCULAR; INTRAVENOUS at 08:03

## 2019-04-14 NOTE — ED TRIAGE NOTES
Patient brought to ED treatment area via wheelchair, for complaint of \"\we were up drinking last night until about 1:30am and then around 2am, I could not pee. My abdomen is hurting. \" Denies ever having anything like this before. Reports hx of enlarged prostate.  Reports vomiting and diarrhea since 2am.

## 2019-04-14 NOTE — ED PROVIDER NOTES
Patient is a 48-year-old male presenting to the emergency department with severe abdominal pain, nausea, vomiting. Patient was playing poker with his friends last night drinking beer until approximately 1:30 in the morning noticed that he had not urinated yet began to have severe abdominal pain nausea and vomiting but continued on until presentation this morning. Patient has a history of BPH has never had acute urinary retention at this his never required a Caldera insertion. Patient has a urologist Eda Bolanos. Past Medical History:  
Diagnosis Date  Cancer ([facility] [address] skin cancer  Dysthymic disorder  Hypertension Past Surgical History:  
Procedure Laterality Date  COLONOSCOPY  1960 Normal  
 HX GI    
 hernia repair as a child in his groin  HX GI    
 colonoscopy  HX HEENT  2006  
 tonsillectomy  HX HEENT    
 wisdom teeth removed and all teeth removed has dentures  HX ORTHOPAEDIC    
 spur removed from back of neck Family History:  
Problem Relation Age of Onset  Cancer Mother  Heart Disease Mother  Lung Disease Mother  Stroke Mother  Cancer Father  Alcohol abuse Father  Alcohol abuse Brother Social History Socioeconomic History  Marital status:  Spouse name: Not on file  Number of children: Not on file  Years of education: Not on file  Highest education level: Not on file Occupational History  Not on file Social Needs  Financial resource strain: Not on file  Food insecurity:  
  Worry: Not on file Inability: Not on file  Transportation needs:  
  Medical: Not on file Non-medical: Not on file Tobacco Use  Smoking status: Former Smoker Packs/day: 0.00 Last attempt to quit: 2013 Years since quittin.7  Smokeless tobacco: Former User Substance and Sexual Activity  Alcohol use: Yes Comment: patient states drink beer or liquor once a month  Drug use: No  
 Sexual activity: Yes  
  Partners: Female Birth control/protection: None Lifestyle  Physical activity:  
  Days per week: Not on file Minutes per session: Not on file  Stress: Not on file Relationships  Social connections:  
  Talks on phone: Not on file Gets together: Not on file Attends Bahai service: Not on file Active member of club or organization: Not on file Attends meetings of clubs or organizations: Not on file Relationship status: Not on file  Intimate partner violence:  
  Fear of current or ex partner: Not on file Emotionally abused: Not on file Physically abused: Not on file Forced sexual activity: Not on file Other Topics Concern  Not on file Social History Narrative  Not on file ALLERGIES: Other medication Review of Systems Gastrointestinal: Positive for abdominal distention, abdominal pain, nausea and vomiting. Genitourinary: Positive for difficulty urinating. All other systems reviewed and are negative. Vitals:  
 04/14/19 0752 04/14/19 0754 04/14/19 0800 BP: 173/90 173/90 169/82 Pulse:  80 Resp:  22 Temp:  98.6 °F (37 °C) SpO2:  96% 96% Weight:  90.7 kg (200 lb) Height:  5' 9\" (1.753 m) Physical Exam  
Constitutional: He is oriented to person, place, and time. He appears distressed. HENT:  
Head: Normocephalic and atraumatic. Eyes: Pupils are equal, round, and reactive to light. EOM are normal.  
Cardiovascular: Normal rate. Pulmonary/Chest: Effort normal.  
Abdominal: He exhibits distension. There is tenderness in the epigastric area. Neurological: He is alert and oriented to person, place, and time. Skin: Skin is warm and dry. Psychiatric: He has a normal mood and affect. His behavior is normal.  
  
 
MDM Number of Diagnoses or Management Options Urinary retention:  
Diagnosis management comments: A/P: Acute urinary retention secondary to BPH. Gen. male history of BPH unclear patient to take his Flomax yesterday presenting with severe suprapubic tenderness urinary retention. Caldera placed with immediate return greater than 800 cc of urine patient with immediate relief. We'll discharge patient with Caldera inserted and leg bag patient will need voiding trial with urology continue to take Flomax patient and wife understand and agree with plan. Amount and/or Complexity of Data Reviewed Clinical lab tests: reviewed and ordered Risk of Complications, Morbidity, and/or Mortality Presenting problems: moderate Diagnostic procedures: moderate Management options: moderate Patient Progress Patient progress: resolved Procedures

## 2019-04-14 NOTE — DISCHARGE INSTRUCTIONS
Patient Education        Urinary Retention: Care Instructions  Your Care Instructions    Urinary retention means that you aren't able to urinate. In men, it is often caused by a blockage of the urinary tract from an enlarged prostate gland. In men and women, it can also be caused by an infection or nerve damage. Or it may be a side effect of a medicine. The doctor may have drained the urine from your bladder. If you still have problems passing urine, you may need to use a catheter at home. This is used to empty your bladder until the problem can be fixed. Your doctor may put a catheter in your bladder before you go home. If so, he or she will tell you when to come back to have the catheter removed. The doctor has checked you closely. But problems can develop later. If you notice any problems or new symptoms, get medical treatment right away. Follow-up care is a key part of your treatment and safety. Be sure to make and go to all appointments, and call your doctor if you are having problems. It's also a good idea to know your test results and keep a list of the medicines you take. How can you care for yourself at home? · Take your medicines exactly as prescribed. Call your doctor if you think you are having a problem with your medicine. You will get more details on the specific medicines your doctor prescribes. · Check with your doctor before you use any over-the-counter medicines. Many cold and allergy medicines, for example, can make this problem worse. Make sure your doctor knows all of the medicines, vitamins, supplements, and herbal remedies you take. · Spread out through the day the amount of fluid you drink. Do not drink a lot at bedtime. · Avoid alcohol and caffeine. · If you have been given a catheter, or if one is already in place, follow the instructions you were given. Always wash your hands before and after you handle the catheter. When should you call for help?   Call your doctor now or seek immediate medical care if:    · You cannot urinate at all, or it is getting harder to urinate.     · You have symptoms of a urinary tract infection. These may include:  ? Pain or burning when you urinate. ? A frequent need to urinate without being able to pass much urine. ? Pain in the flank, which is just below the rib cage and above the waist on either side of the back. ? Blood in your urine. ? A fever.    Watch closely for changes in your health, and be sure to contact your doctor if:    · You have any problems with your catheter.     · You do not get better as expected. Where can you learn more? Go to http://jackie-rajeev.info/. Enter M244 in the search box to learn more about \"Urinary Retention: Care Instructions. \"  Current as of: March 20, 2018  Content Version: 11.9  © 2547-0909 [employer]. Care instructions adapted under license by [employer] (which disclaims liability or warranty for this information). If you have questions about a medical condition or this instruction, always ask your healthcare professional. [address] any warranty or liability for your use of this information.

## 2021-10-19 ENCOUNTER — TRANSCRIBE ORDER (OUTPATIENT)
Dept: GENERAL RADIOLOGY | Age: 61
End: 2021-10-19

## 2021-10-19 ENCOUNTER — HOSPITAL ENCOUNTER (OUTPATIENT)
Dept: GENERAL RADIOLOGY | Age: 61
Discharge: HOME | End: 2021-10-19
Payer: COMMERCIAL

## 2021-10-19 DIAGNOSIS — R06.09 PLATYPNEA-ORTHODEOXIA SYNDROME: ICD-10-CM

## 2021-10-19 DIAGNOSIS — R06.09 PLATYPNEA-ORTHODEOXIA SYNDROME: Primary | ICD-10-CM

## 2021-10-19 PROCEDURE — 71046 X-RAY EXAM CHEST 2 VIEWS: CPT

## 2022-03-09 ENCOUNTER — TELEPHONE (OUTPATIENT)
Dept: ENT CLINIC | Age: 62
End: 2022-03-09

## 2022-03-09 NOTE — TELEPHONE ENCOUNTER
Pt called stating that he needs ear buds made for him for his job. He states that he works at [employer] and that they instructed him to call and inform us of that and that we would know what he was referring to about Ear Buds. I advised him that our audiologist is out for the week and will be returning Monday and once I have more information about what he is inquiring about I will return his call monday to let him know if we are able to do this for him and if we are able to then I will schedule an appointment. Please Advise.

## 2022-03-14 ENCOUNTER — TELEPHONE (OUTPATIENT)
Dept: ENT CLINIC | Age: 62
End: 2022-03-14

## 2022-03-14 NOTE — TELEPHONE ENCOUNTER
Pt called back regarding the ear buds and upon informing him about the price and the fee for an earmold impression he stated that [employer] is supposed to cover all of that. He would like to know if there is a way to confirm that the ear buds will be covered by [employer].

## 2022-03-14 NOTE — TELEPHONE ENCOUNTER
Called pt to return his call from last week regarding custom ear buds for his occupation after receiving more information from our audiologist.     Dr. Annemarie Barnes stated that we can do this if he is referring to custom ear plugs. It is $100 for the ear plugs and $75 for the impression. LVM for pt giving information and instructed to call the office back if he would like to schedule an appt. If he would like an appt, it is an earmold impression appt (just do hearing aid check, with \"SPIKE\" in the notes).

## 2022-03-18 PROBLEM — M20.11 HALLUX VALGUS (ACQUIRED), RIGHT FOOT: Status: ACTIVE | Noted: 2018-12-20

## 2022-03-19 PROBLEM — M20.41 HAMMERTOE OF RIGHT FOOT: Status: ACTIVE | Noted: 2018-12-20

## 2022-03-20 PROBLEM — M62.40: Status: ACTIVE | Noted: 2018-12-20

## 2022-12-12 ENCOUNTER — OFFICE VISIT (OUTPATIENT)
Dept: NEUROLOGY | Age: 62
End: 2022-12-12
Payer: COMMERCIAL

## 2022-12-12 VITALS
RESPIRATION RATE: 16 BRPM | BODY MASS INDEX: 28.81 KG/M2 | OXYGEN SATURATION: 97 % | DIASTOLIC BLOOD PRESSURE: 78 MMHG | HEART RATE: 78 BPM | HEIGHT: 69 IN | SYSTOLIC BLOOD PRESSURE: 120 MMHG | WEIGHT: 194.5 LBS

## 2022-12-12 DIAGNOSIS — R41.89 COGNITIVE IMPAIRMENT: Primary | ICD-10-CM

## 2022-12-12 PROCEDURE — 99204 OFFICE O/P NEW MOD 45 MIN: CPT | Performed by: PSYCHIATRY & NEUROLOGY

## 2022-12-12 RX ORDER — ROSUVASTATIN CALCIUM 20 MG/1
TABLET, COATED ORAL
COMMUNITY
Start: 2022-10-12

## 2022-12-12 RX ORDER — FINASTERIDE 5 MG/1
5 TABLET, FILM COATED ORAL DAILY
COMMUNITY
Start: 2022-11-04

## 2022-12-12 NOTE — LETTER
12/12/2022    Patient: Nam Mckinney. YOB: 1960   Date of Visit: 12/12/2022     Ty Christensen MD  [address] [facility] [id number]  Via Fax: [phone]    Dear Ty Christensen MD,      Thank you for referring Mr. Agnieszka Peterson to [facility] for evaluation. My notes for this consultation are attached. If you have questions, please do not hesitate to call me. I look forward to following your patient along with you.       Sincerely,    Jackson Roberts MD

## 2022-12-12 NOTE — PROGRESS NOTES
NEUROLOGY NEW PATIENT OFFICE CONSULTATION      2022    RE: Jv Phillip.         1960      REFERRED BY:  Virgen Abarca MD        CHIEF COMPLAINT:  This is Jv Phillip. 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 HPI:     For the past 1 yr, patient noted memory issues described as forgetting conversations, not remembering what he was suppose to do, wife takes care of finances and appointments, still independent in ADLs    (-) hallucinations  (-) personality changes  (-) depression  Sleeping okay   (-) loss of hygiene    ROS  (-) fever  (-) rash  All other systems reviewed and are negative    Past Medical Hx  Past Medical History:   Diagnosis Date    Cancer ([facility] [address]     skin cancer    Dysthymic disorder     Hypertension        Social Hx  Social History     Socioeconomic History    Marital status:    Tobacco Use    Smoking status: Former     Packs/day: 0.00     Types: Cigarettes     Quit date: 2013     Years since quittin.4    Smokeless tobacco: Former   Vaping Use    Vaping Use: Never used   Substance and Sexual Activity    Alcohol use: Yes     Comment: couple beers a week    Drug use: No    Sexual activity: Yes     Partners: Female     Birth control/protection: None   Finished 8th grade    Family Hx  Family History   Problem Relation Age of Onset    Cancer Mother     Heart Disease Mother     Lung Disease Mother     Stroke Mother     Cancer Father     Alcohol abuse Father     Alcohol abuse Brother    Dementia - mother    ALLERGIES  Allergies   Allergen Reactions    Other Medication Rash     \"soda ivelisse\"       CURRENT MEDS  Current Outpatient Medications   Medication Sig Dispense Refill    finasteride (PROSCAR) 5 mg tablet Take 5 mg by mouth daily. rosuvastatin (CRESTOR) 20 mg tablet TAKE 1 TABLET BY MOUTH EVERY DAY**FASTING LABS/OFFICE VISIT DUE**      pravastatin (PRAVACHOL) 40 mg tablet Take 40 mg by mouth daily. benazepril-hydroCHLOROthiazide (LOTENSIN HCT) 20-12.5 mg per tablet Take 1 Tab by mouth daily. tamsulosin (FLOMAX) 0.4 mg capsule Take 0.4 mg by mouth daily. multivitamin (ONE A DAY) tablet Take 1 Tab by mouth daily. QUEtiapine (SEROQUEL) 100 mg tablet Take 1 Tab by mouth nightly. 90 Tab 1    FLUoxetine (PROZAC) 40 mg capsule Take 1 Cap by mouth daily. 90 Cap 1    indomethacin (INDOCIN) 25 mg capsule Take  by mouth three (3) times daily. PREVIOUS WORKUP: (reviewed)  IMAGING:    CT Results (recent):  No results found for this or any previous visit. MRI Results (recent):  No results found for this or any previous visit. IR Results (recent):  No results found for this or any previous visit. VAS/US Results (recent):  No results found for this or any previous visit. LABS (reviewed)  Results for orders placed or performed during the hospital encounter of 04/14/19   URINE CULTURE HOLD SAMPLE    Specimen: Serum; Urine   Result Value Ref Range    Urine culture hold        URINE ON HOLD IN MICROBIOLOGY DEPT FOR 3 DAYS. IF UNPRESERVED URINE IS SUBMITTED, IT CANNOT BE USED FOR ADDITIONAL TESTING AFTER 24 HRS, RECOLLECTION WILL BE REQUIRED. SAMPLES BEING HELD   Result Value Ref Range    SAMPLES BEING HELD 1RED,1GRN,1BLUE     COMMENT        Add-on orders for these samples will be processed based on acceptable specimen integrity and analyte stability, which may vary by analyte. CBC WITH AUTOMATED DIFF   Result Value Ref Range    WBC 7.7 4.1 - 11.1 K/uL    RBC 4.63 4.10 - 5.70 M/uL    HGB 14.1 12.1 - 17.0 g/dL    HCT 40.9 36.6 - 50.3 %    MCV 88.3 80.0 - 99.0 FL    MCH 30.5 26.0 - 34.0 PG    MCHC 34.5 30.0 - 36.5 g/dL    RDW 13.8 11.5 - 14.5 %    PLATELET 344 897 - 171 K/uL    MPV 9.6 8.9 - 12.9 FL    NEUTROPHILS 76 (H) 32 - 75 %    LYMPHOCYTES 18 12 - 49 %    MONOCYTES 5 5 - 13 %    EOSINOPHILS 1 0 - 7 %    BASOPHILS 0 0 - 1 %    ABS. NEUTROPHILS 5.8 1.8 - 8.0 K/UL    ABS. LYMPHOCYTES 1.4 K/UL    ABS. MONOCYTES 0.4 0.0 - 1.0 K/UL    ABS. EOSINOPHILS 0.0 0.0 - 0.4 K/UL    ABS. BASOPHILS 0.0 0.0 - 0.1 K/UL    DF AUTOMATED     METABOLIC PANEL, COMPREHENSIVE   Result Value Ref Range    Sodium 142 136 - 145 mmol/L    Potassium 4.1 3.5 - 5.1 mmol/L    Chloride 105 97 - 108 mmol/L    CO2 24 21 - 32 mmol/L    Anion gap 13 5 - 15 mmol/L    Glucose 126 (H) 65 - 100 mg/dL    BUN 14 6 - 20 MG/DL    Creatinine 1.06 0.70 - 1.30 MG/DL    BUN/Creatinine ratio 13 12 - 20      GFR est AA >60 >60 ml/min/1.73m2    GFR est non-AA >60 >60 ml/min/1.73m2    Calcium 9.1 8.5 - 10.1 MG/DL    Bilirubin, total 0.4 0.2 - 1.0 MG/DL    ALT (SGPT) 33 12 - 78 U/L    AST (SGOT) 18 15 - 37 U/L    Alk. phosphatase 91 45 - 117 U/L    Protein, total 7.2 6.4 - 8.2 g/dL    Albumin 4.0 3.5 - 5.0 g/dL    Globulin 3.2 2.0 - 4.0 g/dL    A-G Ratio 1.3 1.1 - 2.2     LIPASE   Result Value Ref Range    Lipase 639 (H) 73 - 393 U/L   URINALYSIS W/MICROSCOPIC   Result Value Ref Range    Color YELLOW/STRAW      Appearance CLEAR CLEAR      Specific gravity 1.005 1.003 - 1.030      pH (UA) 5.5 5.0 - 8.0      Protein NEGATIVE NEG mg/dL    Glucose NEGATIVE NEG mg/dL    Ketone NEGATIVE NEG mg/dL    Bilirubin NEGATIVE NEG      Blood SMALL (A) NEG      Urobilinogen 0.2 0.2 - 1.0 EU/dL    Nitrites NEGATIVE NEG      Leukocyte Esterase NEGATIVE NEG      WBC 0-4 0 - 4 /hpf    RBC 0-5 0 - 5 /hpf    Epithelial cells FEW FEW /lpf    Bacteria NEGATIVE NEG /hpf       Physical Exam:   Visit Vitals  /78 (BP 1 Location: Left upper arm, BP Patient Position: Sitting, BP Cuff Size: Large adult)   Pulse 78   Resp 16   Ht 5' 9\" (1.753 m)   Wt 88.2 kg (194 lb 8 oz)   SpO2 97%   BMI 28.72 kg/m²     General:  Alert, cooperative, no distress. Head:  Normocephalic, without obvious abnormality, atraumatic. Eyes:  Conjunctivae/corneas clear.    Lungs:  Heart:   Non labored breathing  Regular rate and rhythm, no carotid bruits   Abdomen:   Soft, non-distended Extremities: Extremities normal, atraumatic, no cyanosis or edema. Pulses: 2+ and symmetric all extremities. Skin: Skin color, texture, turgor normal. No rashes or lesions. Neurologic Exam     Gen: MMSE 27/30  Attention normal             Language: naming, repetition, fluency normal             Memory: intact recent and remote memory  Problem with spelling and copying  Cranial Nerves:  I: smell Not tested   II: visual fields Full to confrontation   II: pupils Equal, round, reactive to light   II: optic disc No papilledema   III,VII: ptosis none   III,IV,VI: extraocular muscles  Full ROM   V: mastication normal   V: facial light touch sensation  normal   VII: facial muscle function   symmetric   VIII: hearing symmetric   IX: soft palate elevation  normal   XI: trapezius strength  5/5   XI: sternocleidomastoid strength 5/5   XI: neck flexion strength  5/5   XII: tongue  midline     Motor: normal bulk and tone, no tremor              Strength: 5/5 all four extremities  Sensory: intact to LT, PP, vibration, and JPS  Reflexes: 2+ throughout; Down going toes  Coordination: Good FTN and HTS  Gait: normal gait including tandem            Impression:     Duenas Shock. is a 60 y.o. male who  has a past medical history of Cancer ([facility] [address] Dysthymic disorder, and Hypertension. who for the past 1 yr, patient noted memory issues described as forgetting conversations, not remembering what he was suppose to do, wife takes care of finances and appointments, still independent in ADLs. MMSE is 27/30. Consideration include smild cognitive impairment. Evaluate for early stage of dementia. RECOMMENDATIONS  1. I had a long discussion with patient and wife. Discussed diagnosis, prognosis, pathophysiology and available treatment. Reviewed test results. All questions were answered. 2. Will refer for neuropsychological testing  3.  Advise to keep mind active, stay socially engaged, exercise and eat a Mediterranean diet      Follow-up and Dispositions    Return for review of results.             Thank you for the consultation      Diania Lennox, MD  Diplomate, American Board of Psychiatry and Neurology  Diplomate, Neuromuscular Medicine  Diplomate, American Board of Electrodiagnostic Medicine        CC: Jimmie Hendricks MD  Fax: [phone]

## 2022-12-12 NOTE — PROGRESS NOTES
Identified pt with two pt identifiers(name and ). Reviewed record in preparation for visit and have obtained necessary documentation.   Chief Complaint   Patient presents with    New Patient    Memory Loss           Visit Vitals  /78 (BP 1 Location: Left upper arm, BP Patient Position: Sitting, BP Cuff Size: Large adult)   Pulse 78   Resp 16   Ht 5' 9\" (1.753 m)   Wt 194 lb 8 oz (88.2 kg)   SpO2 97%   BMI 28.72 kg/m²     Pain Scale: 0 - No pain/10

## 2023-01-12 ENCOUNTER — OFFICE VISIT (OUTPATIENT)
Dept: NEUROLOGY | Age: 63
End: 2023-01-12
Payer: COMMERCIAL

## 2023-01-12 DIAGNOSIS — F34.1 DYSTHYMIC DISORDER: ICD-10-CM

## 2023-01-12 DIAGNOSIS — Z87.898 HISTORY OF LEARNING DISABILITY: ICD-10-CM

## 2023-01-12 DIAGNOSIS — R41.3 SHORT-TERM MEMORY LOSS: ICD-10-CM

## 2023-01-12 DIAGNOSIS — R41.89 COGNITIVE DECLINE: ICD-10-CM

## 2023-01-12 DIAGNOSIS — G31.84 MILD COGNITIVE IMPAIRMENT: Primary | ICD-10-CM

## 2023-01-12 DIAGNOSIS — Z81.8 FAMILY HISTORY OF DEMENTIA: ICD-10-CM

## 2023-01-12 PROCEDURE — 90791 PSYCH DIAGNOSTIC EVALUATION: CPT | Performed by: CLINICAL NEUROPSYCHOLOGIST

## 2023-01-12 NOTE — PROGRESS NOTES
[facility],[address]  [address]   [place] [address] [place] [facility]    [phone] Office   [phone] Fax      Neuropsychology    Initial Diagnostic Interview Note      Referral:  MD Mayra Smallwood. is a 60 y.o. right handed  [place] male who was unaccompanied to the initial clinical interview on 1/12/23. Please refer to his medical records for details pertaining to his history. At the start of the appointment, I reviewed the patient's [facility] Epic Chart (including Media scanned in from previous providers) for the active Problem List, all pertinent Past Medical Hx, medications, recent radiologic and laboratory findings. In addition, I reviewed pt's documented Immunization Record and Encounter History. He has past medical history of Cancer [facility]), Dysthymic disorder, and Hypertension. 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 For the past year or so, he has had a progressive decline in short term memory. Forgets conversations. Misplaces things. Starts tasks and does not complete. This is getting worse. No known history of stroke, meningitis/encephalitis, AUGUSTUS Fever, Lupus, Lyme, TBI. Almost every one of his father's five brothers has/had dementia. He has not had any driving issues. He has noticed some difficulties at work. He takes his own medications. Spouse has always done the finances. He takes a sleeping pill at night. His appetite is fine. No changes in sense of taste or smell. No counseling or psychiatrist.   Spouse and two daughters have noticed and are quite concerned. MMSE 26/30 today.       Neuropsychological Mental Status Exam (NMSE):    Historian: Good  Praxis: No UE apraxia  R/L Orientation: Intact to self and to other  Dress: within normal limits   Weight: overweight Appearance/Hygiene: within normal limits   Gait: within normal limits   Assistive Devices: Glasses  Mood: Mildly depressed   Affect: Flat   Comprehension: within normal limits   Thought Process: within normal limits   Expressive Language: within normal limits   Receptive Language: within normal limits   Motor:  No cognitive or motor perseveration  ETOH:a couple of drinks here or there  Tobacco: snuff occasionally  MJ: Denied  Illicit: Denied  SI/HI: Denied  Psychosis: Denied  Insight: Within normal limits  Judgment: Within normal limits  Other Psych:      Past Medical History:   Diagnosis Date    Cancer ([facility] [address]     skin cancer    Dysthymic disorder     Hypertension        Past Surgical History:   Procedure Laterality Date    COLONOSCOPY  1960    Normal    HX GI      hernia repair as a child in his groin    HX GI      colonoscopy    HX HEENT  2006    tonsillectomy    HX HEENT      wisdom teeth removed and all teeth removed has dentures    HX ORTHOPAEDIC      spur removed from back of neck       Allergies   Allergen Reactions    Other Medication Rash     \"soda ivelisse\"       Family History   Problem Relation Age of Onset    Cancer Mother     Heart Disease Mother     Lung Disease Mother     Stroke Mother     Cancer Father     Alcohol abuse Father     Alcohol abuse Brother        Social History     Tobacco Use    Smoking status: Former     Packs/day: 0.00     Types: Cigarettes     Quit date: 2013     Years since quittin.5    Smokeless tobacco: Former   Vaping Use    Vaping Use: Never used   Substance Use Topics    Alcohol use: Yes     Comment: couple beers a week    Drug use: No       Current Outpatient Medications   Medication Sig Dispense Refill    finasteride (PROSCAR) 5 mg tablet Take 5 mg by mouth daily. rosuvastatin (CRESTOR) 20 mg tablet TAKE 1 TABLET BY MOUTH EVERY DAY**FASTING LABS/OFFICE VISIT DUE**      pravastatin (PRAVACHOL) 40 mg tablet Take 40 mg by mouth daily. benazepril-hydroCHLOROthiazide (LOTENSIN HCT) 20-12.5 mg per tablet Take 1 Tab by mouth daily. tamsulosin (FLOMAX) 0.4 mg capsule Take 0.4 mg by mouth daily. multivitamin (ONE A DAY) tablet Take 1 Tab by mouth daily. QUEtiapine (SEROQUEL) 100 mg tablet Take 1 Tab by mouth nightly. 90 Tab 1    FLUoxetine (PROZAC) 40 mg capsule Take 1 Cap by mouth daily. 90 Cap 1    indomethacin (INDOCIN) 25 mg capsule Take  by mouth three (3) times daily. Plan:  Obtain authorization for testing from insurance company. Report to follow once testing, scoring, and interpretation completed. ? Organic based neurocognitive issues versus mood disorder or combination of same. ? Problems organic, functional, or both? The patient has not gotten better from any treatment to date. Treatment decisions cannot be appropriately made without testing. The patient is not abusing drugs. There is a suspected brain problem, which can be identified, quantified, and hopefully addressed via neurocognitive and psychological testing. This note will not be viewable in [address]

## 2023-01-13 ENCOUNTER — OFFICE VISIT (OUTPATIENT)
Dept: NEUROLOGY | Age: 63
End: 2023-01-13
Payer: COMMERCIAL

## 2023-01-13 DIAGNOSIS — R41.840 ATTENTION DEFICIT: ICD-10-CM

## 2023-01-13 DIAGNOSIS — G31.84 MILD COGNITIVE IMPAIRMENT: Primary | ICD-10-CM

## 2023-01-13 DIAGNOSIS — F32.0 MILD MAJOR DEPRESSION (HCC): ICD-10-CM

## 2023-01-13 DIAGNOSIS — F81.9 LEARNING DISABILITIES: ICD-10-CM

## 2023-01-13 DIAGNOSIS — F41.1 GENERALIZED ANXIETY DISORDER: ICD-10-CM

## 2023-01-13 DIAGNOSIS — Z86.59 HISTORY OF ADHD: ICD-10-CM

## 2023-01-13 DIAGNOSIS — Z81.8 FAMILY HISTORY OF DEMENTIA: ICD-10-CM

## 2023-01-13 PROCEDURE — 96137 PSYCL/NRPSYC TST PHY/QHP EA: CPT | Performed by: CLINICAL NEUROPSYCHOLOGIST

## 2023-01-13 PROCEDURE — 96136 PSYCL/NRPSYC TST PHY/QHP 1ST: CPT | Performed by: CLINICAL NEUROPSYCHOLOGIST

## 2023-01-13 PROCEDURE — 96133 NRPSYC TST EVAL PHYS/QHP EA: CPT | Performed by: CLINICAL NEUROPSYCHOLOGIST

## 2023-01-13 PROCEDURE — 96139 PSYCL/NRPSYC TST TECH EA: CPT | Performed by: CLINICAL NEUROPSYCHOLOGIST

## 2023-01-13 PROCEDURE — 96132 NRPSYC TST EVAL PHYS/QHP 1ST: CPT | Performed by: CLINICAL NEUROPSYCHOLOGIST

## 2023-01-13 PROCEDURE — 96138 PSYCL/NRPSYC TECH 1ST: CPT | Performed by: CLINICAL NEUROPSYCHOLOGIST

## 2023-01-13 NOTE — LETTER
1/18/2023    Patient: Vonda Elam. YOB: 1960   Date of Visit: 1/13/2023     Paola Rosen [place]  [address] [facility] [address] [phone]  Via Fax: [phone]    Dear Paola Rosen MD,      Thank you for referring Mr. Antonino Rizvi to [facility] for evaluation. My notes for this consultation are attached. If you have questions, please do not hesitate to call me. I look forward to following your patient along with you.       Sincerely,    Mary Rouse PsyD

## 2023-01-18 NOTE — PROGRESS NOTES
[address] [facility]  [address]   [address] [place] [address] [facility]   [place] [address]    [phone] Office   [phone] Fax      Neuropsychological Evaluation Report      Referral:  MD Karel Leger. is a 60 y.o. right handed  [place] male who was unaccompanied to the initial clinical interview on 1/12/23. Please refer to his medical records for details pertaining to his history. At the start of the appointment, I reviewed the patient's [facility] Epic Chart (including Media scanned in from previous providers) for the active Problem List, all pertinent Past Medical Hx, medications, recent radiologic and laboratory findings. In addition, I reviewed pt's documented Immunization Record and Encounter History. He has past medical history of Cancer [facility]), Dysthymic disorder, and Hypertension. 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 For the past year or so, he has had a progressive decline in short term memory. Forgets conversations. Misplaces things. Starts tasks and does not complete. This is getting worse. No known history of stroke, meningitis/encephalitis, AUGUSTUS Fever, Lupus, Lyme, TBI. Almost every one of his father's five brothers has/had dementia. He has not had any driving issues. He has noticed some difficulties at work. He takes his own medications. Spouse has always done the finances. He takes a sleeping pill at night. His appetite is fine. No changes in sense of taste or smell. No counseling or psychiatrist.   Spouse and two daughters have noticed and are quite concerned. MMSE 26/30 today.       Neuropsychological Mental Status Exam (NMSE):    Historian: Good  Praxis: No UE apraxia  R/L Orientation: Intact to self and to other  Dress: within normal limits   Weight: overweight   Appearance/Hygiene: within normal limits   Gait: within normal limits   Assistive Devices: Glasses  Mood: Mildly depressed   Affect: Flat   Comprehension: within normal limits   Thought Process: within normal limits   Expressive Language: within normal limits   Receptive Language: within normal limits   Motor:  No cognitive or motor perseveration  ETOH:a couple of drinks here or there  Tobacco: snuff occasionally  MJ: Denied  Illicit: Denied  SI/HI: Denied  Psychosis: Denied  Insight: Within normal limits  Judgment: Within normal limits  Other Psych:      Past Medical History:   Diagnosis Date    Cancer ([facility] [address]     skin cancer    Dysthymic disorder     Hypertension        Past Surgical History:   Procedure Laterality Date    COLONOSCOPY  1959    Normal    HX GI      hernia repair as a child in his groin    HX GI      colonoscopy    HX HEENT  2006    tonsillectomy    HX HEENT      wisdom teeth removed and all teeth removed has dentures    HX ORTHOPAEDIC      spur removed from back of neck       Allergies   Allergen Reactions    Other Medication Rash     \"soda ivelisse\"       Family History   Problem Relation Age of Onset    Cancer Mother     Heart Disease Mother     Lung Disease Mother     Stroke Mother     Cancer Father     Alcohol abuse Father     Alcohol abuse Brother        Social History     Tobacco Use    Smoking status: Former     Packs/day: 0.00     Types: Cigarettes     Quit date: 2013     Years since quittin.5    Smokeless tobacco: Former   Vaping Use    Vaping Use: Never used   Substance Use Topics    Alcohol use: Yes     Comment: couple beers a week    Drug use: No       Current Outpatient Medications   Medication Sig Dispense Refill    finasteride (PROSCAR) 5 mg tablet Take 5 mg by mouth daily. rosuvastatin (CRESTOR) 20 mg tablet TAKE 1 TABLET BY MOUTH EVERY DAY**FASTING LABS/OFFICE VISIT DUE**      pravastatin (PRAVACHOL) 40 mg tablet Take 40 mg by mouth daily.       benazepril-hydroCHLOROthiazide (LOTENSIN HCT) 20-12.5 mg per tablet Take 1 Tab by mouth daily. tamsulosin (FLOMAX) 0.4 mg capsule Take 0.4 mg by mouth daily. multivitamin (ONE A DAY) tablet Take 1 Tab by mouth daily. QUEtiapine (SEROQUEL) 100 mg tablet Take 1 Tab by mouth nightly. 90 Tab 1    FLUoxetine (PROZAC) 40 mg capsule Take 1 Cap by mouth daily. 90 Cap 1    indomethacin (INDOCIN) 25 mg capsule Take  by mouth three (3) times daily. Plan:  Obtain authorization for testing from insurance company. Report to follow once testing, scoring, and interpretation completed. ? Organic based neurocognitive issues versus mood disorder or combination of same. ? Problems organic, functional, or both? The patient has not gotten better from any treatment to date. Treatment decisions cannot be appropriately made without testing. The patient is not abusing drugs. There is a suspected brain problem, which can be identified, quantified, and hopefully addressed via neurocognitive and psychological testing. This note will not be viewable in [address] Neuropsychological Test Results  Patient Testing 1/13/23 Report Completed 1/18/23  A Psychometrist Assisted w/ portions of this evaluation while under my direct supervision    The following assessment procedures were performed:      Neuropsychologist Performed, Interpreted, & Reported: Neuropsychological Mental Status Exam, Revised Memory & Behavior Checklist, Mini Mental State Exam, Clock Drawing Test, Test Of Premorbid Functioning, Kenneth-Melzack Pain Questionnaire,  History Taking  & Clinical Interview With The Patient, Additional History Taking w/ The Patient's Spouse, CPT-III, JORGE, Review Of Available Records.     Psychometrist Administered & Neuropsychologist Interpreted & Neuropsychologist Reported: Finger Tapping Test, Grooved Pegboard Test, Speech-Sounds Perception Test, [place] Rhythm Test, Wechsler Adult Intelligence Scale - IV, Verbal Fluency Tests, Kumar & Kumar - Revised, Trailmaking Test Parts A & B, [place] Verbal Learning Test - 3, Surinder Complex Figure Test, Fowler Depression Inventory - II, Fowler Anxiety Inventory. Test Findings:  Test Findings:  Note:  The patients raw data have been compared with currently available norms which include demographic corrections for age, gender, and/or education. Sometimes, the patients scores are compared to demographically similar individuals as close to the patients age, education level, etc., as possible. \"Average\" is viewed as being +/- 1 standard deviation (SD) from the stated mean for a particular test score. \"Low average\" is viewed as being between 1 and 2 SD below the mean, and above average is viewed as being 1 and 2 SD above the mean. Scores falling in the borderline range (between 1-1/2 and 2 SD below the mean) are viewed with particular attention as to whether they are normal or abnormal neurocognitive test scores. Other methods of inference in analyzing the test data are also utilized, including the pattern and range of scores in the profile, bilateral motor functions, and the presence, if any, of pathognomonic signs. Behaviorally, the patient was friendly and cooperative and appeared motivated to perform well during this examination. Within this context, the results of this evaluation are viewed as a valid reflection of the patients actual neurocognitive and emotional status. His structured word list fluency, as assessed by the FAS Test, was within the below average range with a T score of 4 40. Category fluency was average with a T score of 52. Confrontation naming, as assessed by the [facility], was within the average range with a T score of 49. This pattern of performance is not indicative of a patient is at increased risk for day-to-day problems with verbal fluency or confrontation naming.      The patient was administered the [facility] Continuous Performance Test - III, a computer-administered test of sustained attention, and review of the subscales within this instrument revealed mild concerns for inattentiveness without impulsivity. Verbal auditory attention and discrimination, as assessed by the Speech-Sounds Perception Test (T = 31) was within the mildly to moderately impaired range. Nonverbal auditory attention and discrimination, as assessed by the [facility] Rhythm Test (T = 22) was within the moderately to severely impaired range. This pattern of performance is indicative of a patient who is at increased risk for day-to-day problems with sustained visual attention/concentration, verbal auditory attention, and nonverbal auditory attention and discrimination related abilities. The patient was administered the Wechsler Adult Intelligence Scale - IV. There was a clinically significant difference between his extremely low range working memory index or 66 and his borderline range processing speed score of 79. This pattern of performance is indicative of a patient who is at increased risk for day-to-day problems with working memory and processing speed. His Verbal Comprehension Index score of 76 was within the borderline range and his Perceptual Reasoning Index score of 77 was borderline. See Appendix I (scanned into media section of this EMR) for full breakdown of IQ test scores. Scores are somewhat lower than what would be expected based on his performance on a task estimating premorbid function levels. The patient was administered the [place] Verbal Learning Test  - 3 and generated a lower normal range the positive learning curve over 5 repeated auditory word list learning trials. An interference trial was low average. Recall for their insert word list was similar to his learning curve and that there is no indicator decline in memory over time. Recognition recall was impaired. Forced choice recall was normal suggesting good effort.   This pattern of performance is indicative of a patient who struggles with learning. His memory scores are normal, relative to his learning. The patients performance on the copy portion of the Surinder Complex Figure Test was within normal limits. Recall for the complex design was within normal limits after both short and long  delays. Recognition recall was within normal limits. This pattern of performance is not indicative of a patient who is at increased risk for day-to-day problems with visual organization and visual delayed memory. Simple timed visual motor sequencing (Trailmaking Test Part A) was within the above average range with a T score of 59. His performance on a similar, but more complex task of timed visual motor sequencing (Trailmaking Test Part B) was within the below average range with a T score of 41. He made 0 sequencing errors on this latter completed test.  Taken together, this pattern of performance is not indicative of a patient who is at increased risk for day-to-day problems with executive functioning.  strength was within the moderately to severely impaired range for his dominant hand (= 23) and mildly impaired for his nondominant hand (= 39). Fine motor dexterity was normal bilaterally. This is a mixed pattern of performance with respect to motor skills and neurologic correlation is indicated. The patient rated his current level of pain as \"0/5-no pain\" on the Kenneth-Melzack Pain Questionnaire. His Fowler Depression Inventory -II score of 16 was within the mildly depressed range. His Fowler Anxiety Inventory score of 9 reflected mild anxiety. The patient was administered the Personality Assessment Inventory and generated a valid profile for interpretation. Within this context, he is plagued by worry to the degree that his ability to focus and to attend are significantly compromised. Self-concept is harsh and negative.   He is distant in those relationships that are maintained. He can be impatient and easily irritated at times. He is highly motivated for treatment. Impressions & Recommendations: This patient generated a mixed normal/abnormal range Neuropsychological Evaluation with respect to neurocognitive functioning. In this regard, his intellectual capacity is functioning mostly within the borderline range and he is showing evidence of a chronic learning disability. There is evidence of attention deficit issues as well. When his memory scores are measured within this context, though scores are normal, which is inconsistent with dementia. From an emotional standpoint, he reports moderate anxiety which is significant to the degree whereby his ability to focus and to attend are significantly compromised. Depression is also noted. At this point in time, the profile is most consistent with mild cognitive impairment without memory loss. This is not consistent with dementia type process, though the risk for same is elevated and he has a strong family history of this disease process. It is reassuring, though, that there is no evidence of memory decay here. He struggles with learning and struggles with focus, though, which is going to make it look like he has progressive memory decline, especially as I believe his mood is worsening over time. In this regard, in addition to continued medical care, my recommendations include consideration for treatment for his attention deficit issues as well as a review of his psychiatric medication management especially for anxiety but also depression. Supportive counseling may prove helpful as well. He retains capacity at this time. The patient should be encouraged to remain as mentally, physically, and socially active as possible. I am not concerned about day-to-day supervision. We now have extensive baseline neurocognitive and psychologic data on him.   Because approximately 50% of patients with MCI go on to develop dementia, we will track his neurocognitive status closely with a follow-up reevaluation on a as needed basis. I like to see how he does with an improvement in mood and attention first, though. Follow-up then. Clinical correlation is, of course, indicated. I will discuss these findings with the patient when he follows up with me in the near future. A follow up Neuropsychological Evaluation is indicated on a prn basis, especially if there are any cognitive and/or emotional changes. DIAGNOSES:  MCI Without Memory Loss     Learning Disability (General)     Borderline Intellectual Functioning     Anxiety- Moderate with ? Of Panic     Mild Major Depression     History of ADHD     The above information is based upon information currently available to me. If there is any additional information of which I am currently unaware, I would be more than happy to review it upon having it made available to me. Thank you for the opportunity to see this interesting individual.     Sincerely,       Moises Maxwell. Erica Carlos, EdS        dd  CC:  Danielle Cintron MD      Time Documentation:      19531 x1 &  60049 x 1 Neuropsych testing/data gathering by Neuropsychologist (60 minutes)     [phone] x 1  96139 x 7 Test Administration/Data Gathering By Technician: (4 hours). 84864 x 1 (first 30 minutes), 82478 x 7 (each additional 30 minutes)    96132 x 1  96133 x 1 Testing Evaluation Services by Neuropsychologist (1 hour, 50 minutes) 96132 x 1 (first hour), 96133 x 1 (50 minutes)    Definitions:      55606/46913:  Neurobehavioral Status Exam, Clinical interview. Clinical assessment of thinking, reasoning and judgment, by neuropsychologist, both face to face time with patient and time interpreting those test results and reporting, first and subsequent hours)    36749/43337: Neuropsychological Test Administration by Technician/Psychometrist, first 30 minutes and each additional 30 minutes. The above includes: Record review. Review of history provided by patient. Review of collaborative information. Testing by Clinician. Review of raw data. Scoring. Report writing of individual tests administered by Clinician. Integration of individual tests administered by psychometrist with NSE/testing by clinician, review of records/history/collaborative information, case Conceptualization, treatment planning, clinical decision making, report writing, coordination Of Care. Psychometry test codes as time spent by psychometrist administering and scoring neurocognitive/psychological tests under supervision of neuropsychologist.  Integral services including scoring of raw data, data interpretation, case conceptualization, report writing etcetera were initiated after the patient finished testing/raw data collected and was completed on the date the report was signed.

## 2023-02-07 ENCOUNTER — OFFICE VISIT (OUTPATIENT)
Dept: NEUROLOGY | Age: 63
End: 2023-02-07
Payer: COMMERCIAL

## 2023-02-07 DIAGNOSIS — F81.9 LEARNING DISABILITIES: ICD-10-CM

## 2023-02-07 DIAGNOSIS — Z81.8 FAMILY HISTORY OF DEMENTIA: ICD-10-CM

## 2023-02-07 DIAGNOSIS — Z86.59 HISTORY OF ADHD: ICD-10-CM

## 2023-02-07 DIAGNOSIS — F41.1 GENERALIZED ANXIETY DISORDER: ICD-10-CM

## 2023-02-07 DIAGNOSIS — G31.84 MILD COGNITIVE IMPAIRMENT: Primary | ICD-10-CM

## 2023-02-07 DIAGNOSIS — F32.0 MILD MAJOR DEPRESSION (HCC): ICD-10-CM

## 2023-02-07 PROCEDURE — 90832 PSYTX W PT 30 MINUTES: CPT | Performed by: CLINICAL NEUROPSYCHOLOGIST

## 2023-02-07 NOTE — PROGRESS NOTES
Prior to seeing the patient I reviewed the records, including the previously completed report, the records in [place], and any updated visits from other providers since I saw the patient last.      Today, I engaged in a psychoeducational and supportive and cognitive/behavioral psychotherapy session with the patient. I provided psychotherapy in the form of psychoeducation and support with respect to the results of the recent Neuropsychological Evaluation, including discussing individual tests as well as patient's areas of neurocognitive strength versus weakness. We discussed, in detail, the following: This patient generated a mixed normal/abnormal range Neuropsychological Evaluation with respect to neurocognitive functioning. In this regard, his intellectual capacity is functioning mostly within the borderline range and he is showing evidence of a chronic learning disability. There is evidence of attention deficit issues as well. When his memory scores are measured within this context, though scores are normal, which is inconsistent with dementia. From an emotional standpoint, he reports moderate anxiety which is significant to the degree whereby his ability to focus and to attend are significantly compromised. Depression is also noted. At this point in time, the profile is most consistent with mild cognitive impairment without memory loss. This is not consistent with dementia type process, though the risk for same is elevated and he has a strong family history of this disease process. It is reassuring, though, that there is no evidence of memory decay here. He struggles with learning and struggles with focus, though, which is going to make it look like he has progressive memory decline, especially as I believe his mood is worsening over time.   In this regard, in addition to continued medical care, my recommendations include consideration for treatment for his attention deficit issues as well as a review of his psychiatric medication management especially for anxiety but also depression. Supportive counseling may prove helpful as well. He retains capacity at this time. The patient should be encouraged to remain as mentally, physically, and socially active as possible. I am not concerned about day-to-day supervision. We now have extensive baseline neurocognitive and psychologic data on him. Because approximately 50% of patients with MCI go on to develop dementia, we will track his neurocognitive status closely with a follow-up reevaluation on a as needed basis. I like to see how he does with an improvement in mood and attention first, though. Follow-up then. Clinical correlation is, of course, indicated. I will discuss these findings with the patient when he follows up with me in the near future. A follow up Neuropsychological Evaluation is indicated on a prn basis, especially if there are any cognitive and/or emotional changes. DIAGNOSES:              MCI Without Memory Loss                                      Learning Disability (General)                                      Borderline Intellectual Functioning                                      Anxiety- Moderate with ? Of Panic                                      Mild Major Depression                                      History of ADHD         Education was provided regarding my diagnostic impressions, and we discussed treatment plan/options. I also answered numerous questions related to the clinical findings, including discussing various methods to improve cognition and mood. Counseling provided regarding mood and cognition. CBT and supportive psychotherapy techniques were utilized. Supportive/Cognitive Behavioral/Solution Focused psychotherapy provided  Discussed rational versus irrational thinking patterns and their consequences. Discussed healthy/adaptive and unhealthy/maladaptive coping. The patient needs to follow with PCP and manage anxiety. LD and attentional issues chronic. No dementia at present. Suggest yearly evals. Patient relieved with this news.        The patient had the following concerns which I deferred to their referring provider: meds for mood/cognition      Time spent today: 20

## 2023-04-29 RX ORDER — QUETIAPINE FUMARATE 100 MG/1
100 TABLET, FILM COATED ORAL
COMMUNITY
Start: 2014-05-02

## 2023-04-29 RX ORDER — FINASTERIDE 5 MG/1
5 TABLET, FILM COATED ORAL DAILY
COMMUNITY
Start: 2022-11-04

## 2023-04-29 RX ORDER — ROSUVASTATIN CALCIUM 20 MG/1
TABLET, COATED ORAL
COMMUNITY
Start: 2022-10-12

## 2023-04-29 RX ORDER — BENAZEPRIL HYDROCHLORIDE AND HYDROCHLOROTHIAZIDE 20; 12.5 MG/1; MG/1
1 TABLET ORAL DAILY
COMMUNITY

## 2023-04-29 RX ORDER — TAMSULOSIN HYDROCHLORIDE 0.4 MG/1
0.4 CAPSULE ORAL DAILY
COMMUNITY

## 2023-04-29 RX ORDER — FLUOXETINE HYDROCHLORIDE 40 MG/1
40 CAPSULE ORAL DAILY
COMMUNITY
Start: 2014-05-02

## 2023-04-29 RX ORDER — PRAVASTATIN SODIUM 40 MG
40 TABLET ORAL DAILY
COMMUNITY

## 2023-04-29 RX ORDER — INDOMETHACIN 25 MG/1
CAPSULE ORAL 3 TIMES DAILY
COMMUNITY

## 2024-08-04 ENCOUNTER — APPOINTMENT (OUTPATIENT)
Facility: HOSPITAL | Age: 64
End: 2024-08-04
Payer: COMMERCIAL

## 2024-08-04 ENCOUNTER — OFFICE VISIT (OUTPATIENT)
Age: 64
End: 2024-08-04

## 2024-08-04 ENCOUNTER — HOSPITAL ENCOUNTER (EMERGENCY)
Facility: HOSPITAL | Age: 64
Discharge: HOME | End: 2024-08-04
Attending: STUDENT IN AN ORGANIZED HEALTH CARE EDUCATION/TRAINING PROGRAM
Payer: COMMERCIAL

## 2024-08-04 VITALS
TEMPERATURE: 98 F | RESPIRATION RATE: 16 BRPM | BODY MASS INDEX: 29.71 KG/M2 | SYSTOLIC BLOOD PRESSURE: 153 MMHG | DIASTOLIC BLOOD PRESSURE: 66 MMHG | WEIGHT: 200.62 LBS | HEIGHT: 69 IN | HEART RATE: 80 BPM | OXYGEN SATURATION: 97 %

## 2024-08-04 DIAGNOSIS — S91.311A LACERATION OF RIGHT FOOT, INITIAL ENCOUNTER: Primary | ICD-10-CM

## 2024-08-04 DIAGNOSIS — S60.221A CONTUSION OF RIGHT HAND, INITIAL ENCOUNTER: ICD-10-CM
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
## 2024-08-04 DIAGNOSIS — S09.90XA INJURY OF HEAD, INITIAL ENCOUNTER: Primary | ICD-10-CM

## 2024-08-04 DIAGNOSIS — S09.90XA INJURY OF HEAD, INITIAL ENCOUNTER: ICD-10-CM

## 2024-08-04 DIAGNOSIS — S20.212A CONTUSION OF LEFT CHEST WALL, INITIAL ENCOUNTER: ICD-10-CM

## 2024-08-04 PROCEDURE — 90471 IMMUNIZATION ADMIN: CPT | Performed by: STUDENT IN AN ORGANIZED HEALTH CARE EDUCATION/TRAINING PROGRAM

## 2024-08-04 PROCEDURE — 6360000002 HC RX W HCPCS: Performed by: STUDENT IN AN ORGANIZED HEALTH CARE EDUCATION/TRAINING PROGRAM

## 2024-08-04 PROCEDURE — 2500000003 HC RX 250 WO HCPCS: Performed by: STUDENT IN AN ORGANIZED HEALTH CARE EDUCATION/TRAINING PROGRAM

## 2024-08-04 PROCEDURE — 70486 CT MAXILLOFACIAL W/O DYE: CPT

## 2024-08-04 PROCEDURE — 99284 EMERGENCY DEPT VISIT MOD MDM: CPT

## 2024-08-04 PROCEDURE — 72125 CT NECK SPINE W/O DYE: CPT

## 2024-08-04 PROCEDURE — 90714 TD VACC NO PRESV 7 YRS+ IM: CPT | Performed by: STUDENT IN AN ORGANIZED HEALTH CARE EDUCATION/TRAINING PROGRAM

## 2024-08-04 PROCEDURE — 71046 X-RAY EXAM CHEST 2 VIEWS: CPT

## 2024-08-04 PROCEDURE — 73120 X-RAY EXAM OF HAND: CPT

## 2024-08-04 PROCEDURE — 70450 CT HEAD/BRAIN W/O DYE: CPT

## 2024-08-04 PROCEDURE — 73620 X-RAY EXAM OF FOOT: CPT

## 2024-08-04 PROCEDURE — 12004 RPR S/N/AX/GEN/TRK7.6-12.5CM: CPT

## 2024-08-04 RX ORDER — CEPHALEXIN 500 MG/1
500 CAPSULE ORAL 4 TIMES DAILY
Qty: 28 CAPSULE | Refills: 0 | Status: SHIPPED | OUTPATIENT
Start: 2024-08-04 | End: 2024-08-11

## 2024-08-04 RX ORDER — LIDOCAINE HYDROCHLORIDE 10 MG/ML
15 INJECTION, SOLUTION EPIDURAL; INFILTRATION; INTRACAUDAL; PERINEURAL ONCE
Status: COMPLETED | OUTPATIENT
Start: 2024-08-04 | End: 2024-08-04

## 2024-08-04 RX ORDER — LIDOCAINE HYDROCHLORIDE 10 MG/ML
5 INJECTION, SOLUTION EPIDURAL; INFILTRATION; INTRACAUDAL; PERINEURAL ONCE
Status: DISCONTINUED | OUTPATIENT
Start: 2024-08-04 | End: 2024-08-04 | Stop reason: HOSPADM

## 2024-08-04 RX ADMIN — LIDOCAINE HYDROCHLORIDE 15 ML: 10 INJECTION, SOLUTION EPIDURAL; INFILTRATION; INTRACAUDAL; PERINEURAL at 12:56

## 2024-08-04 RX ADMIN — CLOSTRIDIUM TETANI TOXOID ANTIGEN (FORMALDEHYDE INACTIVATED) AND CORYNEBACTERIUM DIPHTHERIAE TOXOID ANTIGEN (FORMALDEHYDE INACTIVATED) 0.5 ML: 5; 2 INJECTION, SUSPENSION INTRAMUSCULAR at 12:55

## 2024-08-04 ASSESSMENT — PAIN DESCRIPTION - LOCATION
LOCATION_2: CHEST
LOCATION_3: HAND
LOCATION_5: ARM
LOCATION: FACE
LOCATION_4: FOOT

## 2024-08-04 ASSESSMENT — PAIN DESCRIPTION - DESCRIPTORS
DESCRIPTORS_4: ACHING
DESCRIPTORS_2: ACHING
DESCRIPTORS: ACHING
DESCRIPTORS_5: ACHING
DESCRIPTORS_3: ACHING

## 2024-08-04 ASSESSMENT — PAIN DESCRIPTION - ORIENTATION
ORIENTATION_3: RIGHT
ORIENTATION_4: RIGHT
ORIENTATION_5: LEFT

## 2024-08-04 ASSESSMENT — PAIN DESCRIPTION - INTENSITY
RATING_3: 1
RATING_4: 3
RATING_2: 1
RATING_5: 1

## 2024-08-04 ASSESSMENT — PAIN - FUNCTIONAL ASSESSMENT: PAIN_FUNCTIONAL_ASSESSMENT: 0-10

## 2024-08-04 ASSESSMENT — PAIN SCALES - GENERAL: PAINLEVEL_OUTOF10: 1

## 2024-08-04 NOTE — PROCEDURES
PROCEDURE NOTE  Date: 8/4/2024   Name: Rodrick Rivero Jr.  YOB: 1960    [employer]    Date/Time: 8/4/2024 4:50 PM    Performed by: Richard Blandon PA-C  Authorized by: Edson Pryor DO    Consent:     Consent obtained:  Verbal    Consent given by:  Patient    Risks, benefits, and alternatives were discussed: yes      Risks discussed:  Infection, pain, poor wound healing, poor cosmetic result, need for additional repair, nerve damage and retained foreign body    Alternatives discussed:  No treatment  Universal protocol:     Procedure explained and questions answered to patient or proxy's satisfaction: yes      Relevant documents present and verified: yes      Patient identity confirmed:  Verbally with patient, hospital-assigned identification number and arm band  Anesthesia:     Anesthesia method:  Local infiltration    Local anesthetic:  Lidocaine 1% w/o epi  Laceration details:     Location:  Foot    Foot location:  Sole of R foot    Length (cm):  8    Depth (mm):  4  Pre-procedure details:     Preparation:  Imaging obtained to evaluate for foreign bodies  Exploration:     Limited defect created (wound extended): no      Hemostasis achieved with:  Direct pressure    Imaging obtained: x-ray      Imaging outcome: foreign body not noted      Wound exploration: entire depth of wound visualized      Wound extent: underlying fracture      Contaminated: no    Treatment:     Area cleansed with:  Chlorhexidine    Amount of cleaning:  Standard    Irrigation solution:  Sterile saline    Irrigation volume:  500ml    Irrigation method:  Syringe    Visualized foreign bodies/material removed: no      Debridement:  None    Undermining:  None    Scar revision: no    Skin repair:     Repair method:  Sutures    Suture size:  4-0    Suture material:  Nylon    Suture technique:  Simple interrupted    Number of sutures:  13  Approximation:     Approximation:  Close  Repair type:     Repair type:

## 2024-08-04 NOTE — DISCHARGE INSTRUCTIONS
Keep dry for 24 hours  No soaking until sutures are removed/fall out  Use bacitracin/neosporin 2 times per day for 7 days    PLEASE KEEP A CLOSE EYE ON YOUR WOUND(S). IF YOU NOTICE RED STREAKING, INCREASING DRAINAGE, WORSENING REDNESS, OR FEVER THEN PLEASE RETURN IMMEDIATELY.

## 2024-08-04 NOTE — ED TRIAGE NOTES
Pt presents to ED with c/o slipping and falling down 12-20 steps this am. Pt with bruising around face, left chest, left antecubital space, right hand and right foot. Pt has 8 cm laceration under right great toe to instep of foot. Pt denies any LOC or vomiting.

## 2024-08-04 NOTE — ED NOTES
The patient was discharged home by Richard SÁNCHEZ  in stable condition. The patient is alert and oriented, in no respiratory distress and discharge vital signs obtained. The patient's diagnosis, condition and treatment were explained. The patient expressed understanding. Prescriptions given/e-scribed to pharmacy. No work/school note given. A discharge plan has been developed. A  was not involved in the process. Aftercare instructions were given.  Pt ambulatory out of the ED with family.

## 2024-08-04 NOTE — ED PROVIDER NOTES
BENAZEPRIL-HYDROCHLORTHIAZIDE (LOTENSIN HCT) 20-12.5 MG PER TABLET    Take 1 tablet by mouth daily    FINASTERIDE (PROSCAR) 5 MG TABLET    Take 1 tablet by mouth daily    FLUOXETINE (PROZAC) 40 MG CAPSULE    Take 1 capsule by mouth daily    INDOMETHACIN (INDOCIN) 25 MG CAPSULE    Take by mouth 3 times daily    PRAVASTATIN (PRAVACHOL) 40 MG TABLET    Take 1 tablet by mouth daily    QUETIAPINE (SEROQUEL) 100 MG TABLET    Take 1 tablet by mouth    ROSUVASTATIN (CRESTOR) 20 MG TABLET    TAKE 1 TABLET BY MOUTH EVERY DAY**FASTING LABS/OFFICE VISIT DUE**    TAMSULOSIN (FLOMAX) 0.4 MG CAPSULE    Take 1 capsule by mouth daily       ALLERGIES     Patient has no known allergies.    FAMILY HISTORY       Family History   Problem Relation Age of Onset   • Alcohol Abuse Father    • Cancer Father    • Stroke Mother    • Lung Disease Mother    • Heart Disease Mother    • Cancer Mother    • Alcohol Abuse Brother           SOCIAL HISTORY       Social History     Socioeconomic History   • Marital status:      Spouse name: None   • Number of children: None   • Years of education: None   • Highest education level: None   Tobacco Use   • Smoking status: Former     Current packs/day: 0.00     Types: Cigarettes     Quit date: 2013     Years since quittin.1   • Smokeless tobacco: Former   Substance and Sexual Activity   • Alcohol use: Yes   • Drug use: No           PHYSICAL EXAM    (up to 7 for level 4, 8 or more for level 5)     ED Triage Vitals [24 1136]   BP Temp Temp Source Pulse Respirations SpO2 Height Weight - Scale   (!) 165/73 98 °F (36.7 °C) Oral 80 16 97 % 1.753 m (5' 9\") 91 kg (200 lb 9.9 oz)       Body mass index is 29.63 kg/m².    Physical Exam  Constitutional:       Appearance: Normal appearance.   HENT:      Head: Normocephalic.      Comments: Forehead ecchymosis  Right-sided periorbital ecchymosis  Extraocular motion intact, no proptosis  No bony tenderness of the face     Nose: Nose normal.

## 2024-10-07 ENCOUNTER — HOSPITAL ENCOUNTER (EMERGENCY)
Facility: HOSPITAL | Age: 64
Discharge: HOME | End: 2024-10-07
Attending: EMERGENCY MEDICINE
Payer: COMMERCIAL

## 2024-10-07 VITALS
DIASTOLIC BLOOD PRESSURE: 80 MMHG | TEMPERATURE: 97.9 F | BODY MASS INDEX: 28.88 KG/M2 | HEIGHT: 69 IN | HEART RATE: 71 BPM | SYSTOLIC BLOOD PRESSURE: 187 MMHG | OXYGEN SATURATION: 97 % | WEIGHT: 195 LBS | RESPIRATION RATE: 18 BRPM

## 2024-10-07 DIAGNOSIS — T83.9XXA COMPLICATION OF FOLEY CATHETER, INITIAL ENCOUNTER (HCC): Primary | ICD-10-CM

## 2024-10-07 PROCEDURE — 99282 EMERGENCY DEPT VISIT SF MDM: CPT

## 2024-10-07 PROCEDURE — 51702 INSERT TEMP BLADDER CATH: CPT

## 2024-10-07 RX ORDER — LIDOCAINE HYDROCHLORIDE 20 MG/ML
JELLY TOPICAL PRN
Status: DISCONTINUED | OUTPATIENT
Start: 2024-10-07 | End: 2024-10-07 | Stop reason: HOSPADM

## 2024-10-07 ASSESSMENT — ENCOUNTER SYMPTOMS
COUGH: 0
ANAL BLEEDING: 0
VOMITING: 0
ABDOMINAL DISTENTION: 0
WHEEZING: 0
DIARRHEA: 0
SHORTNESS OF BREATH: 0
ABDOMINAL PAIN: 0
BLOOD IN STOOL: 0
NAUSEA: 0

## 2024-10-07 ASSESSMENT — PAIN SCALES - GENERAL: PAINLEVEL_OUTOF10: 0

## 2024-10-07 ASSESSMENT — PAIN - FUNCTIONAL ASSESSMENT: PAIN_FUNCTIONAL_ASSESSMENT: 0-10

## 2024-10-07 NOTE — ED TRIAGE NOTES
Patient ambulatory to Triage with c/o urinary retention, bladder spasms and urinary catheter problem    Patient reports that he has  surgery on his prostate on Tuesday with Dr. Morales, states that today he has had decreased urinary output in his hammer bag and bladder spasms    Patient reports that the urine is coming around the catheter and states that he has not had a lot of output since 8 AM.     Patient denies any fevers or chills, states that he is nauseous.

## 2024-10-08 NOTE — ED NOTES
Bladder scan 242ml.   Hammer flushed and a few blood clots removed, hammer now draining.   Rebladder scan 0ml at this time.   MD aware.

## 2024-10-08 NOTE — ED PROVIDER NOTES
[facility] EMERGENCY DEPT  EMERGENCY DEPARTMENT ENCOUNTER      Pt Name: Rodrick Rivero Jr.  MRN: [id number]  Birthdate 1960  Date of evaluation: 10/7/2024  Provider: Keyur Marroquin MD    CHIEF COMPLAINT       Chief Complaint   Patient presents with    Bladder Problem    Urinary Retention    Urinary Catheter         HISTORY OF PRESENT ILLNESS   (Location/Symptom, Timing/Onset, Context/Setting, Quality, Duration, Modifying Factors, Severity)  Note limiting factors.   64M w/ HLD and HTN p/w hammer catheter problem x1d. Pt recently had laser prostetectomy last week and has indwelling hammer since then. Today he noticed that hammer stopped draining w/ leaking urine around catheter. Also feeling lower abd fullness w/ nausea. No F/C, cough, SOB or vomiting.            Review of External Medical Records:     Nursing Notes were reviewed.    REVIEW OF SYSTEMS    (2-9 systems for level 4, 10 or more for level 5)     Review of Systems   Constitutional:  Negative for diaphoresis and fever.   HENT:  Negative for nosebleeds.    Eyes:  Negative for visual disturbance.   Respiratory:  Negative for cough, shortness of breath and wheezing.    Cardiovascular:  Negative for chest pain, palpitations and leg swelling.   Gastrointestinal:  Negative for abdominal distention, abdominal pain, anal bleeding, blood in stool, diarrhea, nausea and vomiting.   Endocrine: Negative for polyuria.   Genitourinary:  Positive for difficulty urinating. Negative for dysuria and hematuria.   Musculoskeletal:  Negative for joint swelling.   Skin:  Negative for wound.   Neurological:  Negative for dizziness, syncope and light-headedness.   Hematological:  Does not bruise/bleed easily.   Psychiatric/Behavioral:  Negative for confusion.        Except as noted above the remainder of the review of systems was reviewed and negative.       PAST MEDICAL HISTORY     Past Medical History:   Diagnosis Date    Cancer (HCC)     skin cancer    Dysthymic disorder

## (undated) DEVICE — INTENDED FOR TISSUE SEPARATION, AND OTHER PROCEDURES THAT REQUIRE A SHARP SURGICAL BLADE TO PUNCTURE OR CUT.: Brand: BARD-PARKER ® CARBON RIB-BACK BLADES

## (undated) DEVICE — STERILE POLYISOPRENE POWDER-FREE SURGICAL GLOVES WITH EMOLLIENT COATING: Brand: PROTEXIS

## (undated) DEVICE — STRETCH BANDAGE ROLL: Brand: DERMACEA

## (undated) DEVICE — ZIMMER® STERILE DISPOSABLE TOURNIQUET CUFF WITH PROTECTIVE SLEEVE AND PLC, DUAL PORT, SINGLE BLADDER, 18 IN. (46 CM)

## (undated) DEVICE — DRAPE,EXTREMITY,89X128,STERILE: Brand: MEDLINE

## (undated) DEVICE — DRAPE SHT 3 QTR PROXIMA 53X77 --

## (undated) DEVICE — NEEDLE HYPO 25GA L1.5IN BVL ORIENTED ECLIPSE

## (undated) DEVICE — GAUZE SPONGES,12 PLY: Brand: CURITY

## (undated) DEVICE — BLADE SAW W9XL31MM THK051MM CUT THK056MM REPL S STL SAG

## (undated) DEVICE — 3M™ DURAPORE™ SURGICAL TAPE 1538-3, 3 INCH X 10 YARD (7,5CM X 9,1M), 4 ROLLS/BOX: Brand: 3M™ DURAPORE™

## (undated) DEVICE — STERILE POLYISOPRENE POWDER-FREE SURGICAL GLOVES: Brand: PROTEXIS

## (undated) DEVICE — BANDAGE COMPR 9 FTX4 IN SMOOTH COMFORTABLE SYNTH ESMRK LF

## (undated) DEVICE — DEVON™ KNEE AND BODY STRAP 60" X 3" (1.5 M X 7.6 CM): Brand: DEVON

## (undated) DEVICE — SOLUTION IV 1000ML 0.9% SOD CHL

## (undated) DEVICE — BLADE SAW W9XL25MM THK051MM CUT THK074MM REPL S STL SAG

## (undated) DEVICE — SUTURE MCRYL SZ 3-0 L27IN ABSRB UD L26MM SH 1/2 CIR Y416H

## (undated) DEVICE — (D)PREP SKN CHLRAPRP APPL 26ML -- CONVERT TO ITEM 371833

## (undated) DEVICE — Device

## (undated) DEVICE — SPLINT CAST PLASTER 4X15FT -- DYNACAST

## (undated) DEVICE — BLADE SAW W5.5XL18MM THK0.38MM CUT THK0.43MM REPL S STL SAG

## (undated) DEVICE — DRAPE C ARM W54XL84IN MINI FOR OEC 6800

## (undated) DEVICE — LIGHT HANDLE: Brand: DEVON

## (undated) DEVICE — SUTURE MCRYL SZ 3-0 L27IN ABSRB UD L19MM PS-2 3/8 CIR PRIM Y427H

## (undated) DEVICE — SYR 3ML LL TIP 1/10ML GRAD --

## (undated) DEVICE — STANDARD SURGICAL GOWN, L: Brand: CONVERTORS

## (undated) DEVICE — SUTURE VCRL SZ 2-0 L27IN ABSRB UD L26MM SH 1/2 CIR J417H

## (undated) DEVICE — KENDALL SCD EXPRESS SLEEVES, KNEE LENGTH, MEDIUM: Brand: KENDALL SCD

## (undated) DEVICE — SUTURE VCRL SZ 3-0 L27IN ABSRB UD L26MM SH 1/2 CIR J416H

## (undated) DEVICE — OCCLUSIVE GAUZE STRIP,3% BISMUTH TRIBROMOPHENATE IN PETROLATUM BLEND: Brand: XEROFORM

## (undated) DEVICE — (D)STRIP SKN CLSR 0.5X4IN WHT --

## (undated) DEVICE — ARGYLE FRAZIER SURGICAL SUCTION INSTRUMENT 10 FR/CH (3.3 MM): Brand: ARGYLE

## (undated) DEVICE — NEEDLE HYPO 18GA L1.5IN PNK S STL HUB POLYPR SHLD REG BVL

## (undated) DEVICE — SYR 10ML LUER LOK 1/5ML GRAD --

## (undated) DEVICE — REM POLYHESIVE ADULT PATIENT RETURN ELECTRODE: Brand: VALLEYLAB

## (undated) DEVICE — BULB SYRINGE, IRRIGATION WITH PROTECTIVE CAP, 60 CC, INDIVIDUALLY WRAPPED: Brand: DOVER

## (undated) DEVICE — BANDAGE E W6INXL11YD CLP CLSR DBL LEN FLEX-MASTER

## (undated) DEVICE — SUTURE MCRYL SZ 4-0 L27IN ABSRB UD L19MM PS-2 1/2 CIR PRIM Y426H

## (undated) DEVICE — 3000CC GUARDIAN II: Brand: GUARDIAN